# Patient Record
Sex: FEMALE | Race: WHITE | Employment: PART TIME | URBAN - NONMETROPOLITAN AREA
[De-identification: names, ages, dates, MRNs, and addresses within clinical notes are randomized per-mention and may not be internally consistent; named-entity substitution may affect disease eponyms.]

---

## 2017-01-09 DIAGNOSIS — K51.919 ULCERATIVE COLITIS WITH COMPLICATION, UNSPECIFIED LOCATION (HCC): Primary | ICD-10-CM

## 2017-02-16 ENCOUNTER — TELEPHONE (OUTPATIENT)
Dept: FAMILY MEDICINE CLINIC | Facility: CLINIC | Age: 62
End: 2017-02-16

## 2017-02-16 NOTE — TELEPHONE ENCOUNTER
Patient states that there is a documented case another employee at her place of work is hospitalized with bacterial meningitis. She had contact with her Thursday and the patient came down with symptoms Monday.  She said she does not like to take medication

## 2017-02-16 NOTE — TELEPHONE ENCOUNTER
If there is a documented case then needs prophylaxis. If suspected watch for headache, neck stiffness, rash and/or fever.

## 2017-03-06 ENCOUNTER — TELEPHONE (OUTPATIENT)
Dept: FAMILY MEDICINE CLINIC | Facility: CLINIC | Age: 62
End: 2017-03-06

## 2017-03-06 RX ORDER — LEVOTHYROXINE SODIUM 0.07 MG/1
75 TABLET ORAL
Qty: 90 TABLET | Refills: 0 | Status: SHIPPED | OUTPATIENT
Start: 2017-03-06 | End: 2017-05-26

## 2017-03-29 ENCOUNTER — TELEPHONE (OUTPATIENT)
Dept: FAMILY MEDICINE CLINIC | Facility: CLINIC | Age: 62
End: 2017-03-29

## 2017-03-29 DIAGNOSIS — Z13.820 SCREENING FOR OSTEOPOROSIS: Primary | ICD-10-CM

## 2017-03-29 NOTE — TELEPHONE ENCOUNTER
Pt states that Dr. Ti Diaz ordered a dexa scan due to long term anti-inflammatory use. Pt states that she jiménez not reached her deductible as of yet and needs to have test ordered as preventative for insurance to cover.  Pt states she called Dr. Milly Moyer offic

## 2017-04-26 ENCOUNTER — HOSPITAL ENCOUNTER (OUTPATIENT)
Dept: BONE DENSITY | Age: 62
Discharge: HOME | End: 2017-04-26
Attending: INTERNAL MEDICINE
Payer: COMMERCIAL

## 2017-04-26 DIAGNOSIS — Z13.820 SCREENING FOR OSTEOPOROSIS: ICD-10-CM

## 2017-04-26 PROCEDURE — 77080 DXA BONE DENSITY AXIAL: CPT

## 2017-05-26 ENCOUNTER — TELEPHONE (OUTPATIENT)
Dept: FAMILY MEDICINE CLINIC | Facility: CLINIC | Age: 62
End: 2017-05-26

## 2017-05-26 DIAGNOSIS — E03.9 ACQUIRED HYPOTHYROIDISM: ICD-10-CM

## 2017-05-26 DIAGNOSIS — Z00.00 PREVENTATIVE HEALTH CARE: Primary | ICD-10-CM

## 2017-05-26 RX ORDER — LEVOTHYROXINE SODIUM 0.07 MG/1
75 TABLET ORAL
Qty: 90 TABLET | Refills: 0 | Status: SHIPPED | OUTPATIENT
Start: 2017-05-26 | End: 2017-09-07

## 2017-05-26 RX ORDER — DESONIDE 0.5 MG/G
CREAM TOPICAL
Qty: 60 G | Refills: 0 | Status: SHIPPED | OUTPATIENT
Start: 2017-05-26 | End: 2017-11-29

## 2017-06-06 ENCOUNTER — LAB ENCOUNTER (OUTPATIENT)
Dept: LAB | Age: 62
End: 2017-06-06
Attending: INTERNAL MEDICINE
Payer: COMMERCIAL

## 2017-06-06 DIAGNOSIS — E03.9 ACQUIRED HYPOTHYROIDISM: ICD-10-CM

## 2017-06-06 DIAGNOSIS — K51.50 LEFT SIDED ULCERATIVE (CHRONIC) COLITIS (HCC): ICD-10-CM

## 2017-06-06 DIAGNOSIS — M19.90 OSTEOARTHRITIS, UNSPECIFIED OSTEOARTHRITIS TYPE, UNSPECIFIED SITE: ICD-10-CM

## 2017-06-06 DIAGNOSIS — Z00.00 PREVENTATIVE HEALTH CARE: ICD-10-CM

## 2017-06-06 PROCEDURE — 84443 ASSAY THYROID STIM HORMONE: CPT | Performed by: INTERNAL MEDICINE

## 2017-06-06 PROCEDURE — 36415 COLL VENOUS BLD VENIPUNCTURE: CPT | Performed by: INTERNAL MEDICINE

## 2017-06-06 PROCEDURE — 80053 COMPREHEN METABOLIC PANEL: CPT | Performed by: INTERNAL MEDICINE

## 2017-06-06 PROCEDURE — 80061 LIPID PANEL: CPT | Performed by: INTERNAL MEDICINE

## 2017-06-06 PROCEDURE — 84439 ASSAY OF FREE THYROXINE: CPT | Performed by: INTERNAL MEDICINE

## 2017-06-06 PROCEDURE — 82306 VITAMIN D 25 HYDROXY: CPT | Performed by: INTERNAL MEDICINE

## 2017-09-07 RX ORDER — LEVOTHYROXINE SODIUM 0.07 MG/1
TABLET ORAL
Qty: 90 TABLET | Refills: 0 | Status: SHIPPED | OUTPATIENT
Start: 2017-09-07 | End: 2017-12-01

## 2017-09-19 ENCOUNTER — LABORATORY ENCOUNTER (OUTPATIENT)
Dept: LAB | Age: 62
End: 2017-09-19
Attending: INTERNAL MEDICINE
Payer: COMMERCIAL

## 2017-09-19 DIAGNOSIS — K51.919 ULCERATIVE COLITIS WITH COMPLICATION, UNSPECIFIED LOCATION (HCC): ICD-10-CM

## 2017-09-19 DIAGNOSIS — K51.50 LEFT SIDED COLITIS WITHOUT COMPLICATIONS (HCC): ICD-10-CM

## 2017-09-19 LAB
ALBUMIN SERPL-MCNC: 3.4 G/DL (ref 3.5–4.8)
ALP LIVER SERPL-CCNC: 95 U/L (ref 46–118)
ALT SERPL-CCNC: 18 U/L (ref 14–54)
AST SERPL-CCNC: 10 U/L (ref 15–41)
BASOPHILS # BLD AUTO: 0.06 X10(3) UL (ref 0–0.1)
BASOPHILS NFR BLD AUTO: 0.5 %
BILIRUB SERPL-MCNC: 0.3 MG/DL (ref 0.1–2)
BUN BLD-MCNC: 13 MG/DL (ref 8–20)
C-REACTIVE PROTEIN: 0.74 MG/DL (ref ?–1)
CALCIUM BLD-MCNC: 9.2 MG/DL (ref 8.3–10.3)
CHLORIDE: 104 MMOL/L (ref 101–111)
CO2: 28 MMOL/L (ref 22–32)
CREAT BLD-MCNC: 0.74 MG/DL (ref 0.55–1.02)
EOSINOPHIL # BLD AUTO: 0.21 X10(3) UL (ref 0–0.3)
EOSINOPHIL NFR BLD AUTO: 1.7 %
ERYTHROCYTE [DISTWIDTH] IN BLOOD BY AUTOMATED COUNT: 12.5 % (ref 11.5–16)
GLUCOSE BLD-MCNC: 88 MG/DL (ref 70–99)
HCT VFR BLD AUTO: 40.2 % (ref 34–50)
HGB BLD-MCNC: 12.8 G/DL (ref 12–16)
IMMATURE GRANULOCYTE COUNT: 0.04 X10(3) UL (ref 0–1)
IMMATURE GRANULOCYTE RATIO %: 0.3 %
LYMPHOCYTES # BLD AUTO: 2.06 X10(3) UL (ref 0.9–4)
LYMPHOCYTES NFR BLD AUTO: 17.2 %
M PROTEIN MFR SERPL ELPH: 7.5 G/DL (ref 6.1–8.3)
MCH RBC QN AUTO: 28.4 PG (ref 27–33.2)
MCHC RBC AUTO-ENTMCNC: 31.8 G/DL (ref 31–37)
MCV RBC AUTO: 89.3 FL (ref 81–100)
MONOCYTES # BLD AUTO: 0.88 X10(3) UL (ref 0.1–0.6)
MONOCYTES NFR BLD AUTO: 7.3 %
NEUTROPHIL ABS PRELIM: 8.76 X10 (3) UL (ref 1.3–6.7)
NEUTROPHILS # BLD AUTO: 8.76 X10(3) UL (ref 1.3–6.7)
NEUTROPHILS NFR BLD AUTO: 73 %
PLATELET # BLD AUTO: 306 10(3)UL (ref 150–450)
POTASSIUM SERPL-SCNC: 4.1 MMOL/L (ref 3.6–5.1)
RBC # BLD AUTO: 4.5 X10(6)UL (ref 3.8–5.1)
RED CELL DISTRIBUTION WIDTH-SD: 40.6 FL (ref 35.1–46.3)
SED RATE-ML: 15 MM/HR (ref 0–25)
SODIUM SERPL-SCNC: 139 MMOL/L (ref 136–144)
WBC # BLD AUTO: 12 X10(3) UL (ref 4–13)

## 2017-09-19 PROCEDURE — 85025 COMPLETE CBC W/AUTO DIFF WBC: CPT

## 2017-09-19 PROCEDURE — 36415 COLL VENOUS BLD VENIPUNCTURE: CPT

## 2017-09-19 PROCEDURE — 80053 COMPREHEN METABOLIC PANEL: CPT

## 2017-09-19 PROCEDURE — 87493 C DIFF AMPLIFIED PROBE: CPT

## 2017-09-19 PROCEDURE — 86140 C-REACTIVE PROTEIN: CPT

## 2017-09-19 PROCEDURE — 85652 RBC SED RATE AUTOMATED: CPT

## 2017-09-27 ENCOUNTER — HOSPITAL ENCOUNTER (OUTPATIENT)
Dept: CT IMAGING | Age: 62
Discharge: HOME | End: 2017-09-27
Attending: NURSE PRACTITIONER
Payer: COMMERCIAL

## 2017-09-27 DIAGNOSIS — R10.32 LEFT LOWER QUADRANT PAIN: ICD-10-CM

## 2017-09-27 PROCEDURE — 74177 CT ABD & PELVIS W/CONTRAST: CPT | Performed by: NURSE PRACTITIONER

## 2017-10-20 ENCOUNTER — TELEPHONE (OUTPATIENT)
Dept: FAMILY MEDICINE CLINIC | Facility: CLINIC | Age: 62
End: 2017-10-20

## 2017-10-20 DIAGNOSIS — Z12.31 SCREENING MAMMOGRAM, ENCOUNTER FOR: Primary | ICD-10-CM

## 2017-11-20 ENCOUNTER — HOSPITAL ENCOUNTER (OUTPATIENT)
Dept: MAMMOGRAPHY | Age: 62
Discharge: HOME | End: 2017-11-20
Attending: INTERNAL MEDICINE
Payer: COMMERCIAL

## 2017-11-20 DIAGNOSIS — Z12.31 SCREENING MAMMOGRAM, ENCOUNTER FOR: ICD-10-CM

## 2017-11-20 PROCEDURE — 77067 SCR MAMMO BI INCL CAD: CPT | Performed by: INTERNAL MEDICINE

## 2017-11-30 RX ORDER — DESONIDE 0.5 MG/G
CREAM TOPICAL
Qty: 60 G | Refills: 0 | Status: SHIPPED | OUTPATIENT
Start: 2017-11-30 | End: 2018-09-11

## 2017-12-01 RX ORDER — LEVOTHYROXINE SODIUM 0.07 MG/1
TABLET ORAL
Qty: 90 TABLET | Refills: 0 | Status: SHIPPED | OUTPATIENT
Start: 2017-12-01 | End: 2018-03-14

## 2018-01-12 ENCOUNTER — OFFICE VISIT (OUTPATIENT)
Dept: FAMILY MEDICINE CLINIC | Facility: CLINIC | Age: 63
End: 2018-01-12

## 2018-01-12 VITALS
BODY MASS INDEX: 36 KG/M2 | DIASTOLIC BLOOD PRESSURE: 88 MMHG | OXYGEN SATURATION: 96 % | HEART RATE: 68 BPM | TEMPERATURE: 99 F | WEIGHT: 201 LBS | SYSTOLIC BLOOD PRESSURE: 138 MMHG

## 2018-01-12 DIAGNOSIS — R05.9 COUGH: ICD-10-CM

## 2018-01-12 DIAGNOSIS — J01.90 ACUTE RHINOSINUSITIS: Primary | ICD-10-CM

## 2018-01-12 PROCEDURE — 99213 OFFICE O/P EST LOW 20 MIN: CPT | Performed by: FAMILY MEDICINE

## 2018-01-12 RX ORDER — AMOXICILLIN AND CLAVULANATE POTASSIUM 875; 125 MG/1; MG/1
1 TABLET, FILM COATED ORAL 2 TIMES DAILY
Qty: 20 TABLET | Refills: 0 | Status: SHIPPED | OUTPATIENT
Start: 2018-01-12 | End: 2018-01-22

## 2018-01-12 RX ORDER — ALBUTEROL SULFATE 90 UG/1
2 AEROSOL, METERED RESPIRATORY (INHALATION) EVERY 6 HOURS PRN
Qty: 1 INHALER | Refills: 1 | Status: SHIPPED | OUTPATIENT
Start: 2018-01-12 | End: 2019-01-12

## 2018-01-12 NOTE — PROGRESS NOTES
HPI:   Katey Appiah is a 63year old female who presents for upper respiratory symptoms for  2  weeks.  Patient reports sore throat, congestion, dry cough, cough with green colored sputum, cough is keeping pt up at night, sinus pain, wheezing, OTC cold med Ulcerative colitis ([facility]    • [facility]    • Vitamin D deficiency       Past Surgical History:  No date:   No date: CARPAL TUNNEL RELEASE  No date: COLONOSCOPY  No date: SIGMOIDOSCOPY,DIAGNOSTIC   Family History   Problem Relation A sx's persist or worsen. No orders of the defined types were placed in this encounter.         Meds & Refills for this Visit:  Signed Prescriptions Disp Refills    Amoxicillin-Pot Clavulanate 875-125 MG Oral Tab 20 tablet 0      Sig: Take 1 tablet by mout

## 2018-01-23 ENCOUNTER — TELEPHONE (OUTPATIENT)
Dept: FAMILY MEDICINE CLINIC | Facility: CLINIC | Age: 63
End: 2018-01-23

## 2018-01-23 NOTE — TELEPHONE ENCOUNTER
Pt was in to see Dr. Sangita Sanders last week 1/12. Was told to call back if she thought she needed prednisone. Pt said she is starting to cough, not sure if she needs it or not. Would like to speak to nurse. Still using inhaler.

## 2018-01-23 NOTE — TELEPHONE ENCOUNTER
Patient said that she finished the Augmentin yesterday, she is taking Mucinex DM and using her Albuterol every 4-6 hours as needed. She said her chest just feels tight. She said Dr Brittani Aguilar was going to put her on a steroid but she wanted to hold off.  She s

## 2018-01-25 ENCOUNTER — TELEPHONE (OUTPATIENT)
Dept: FAMILY MEDICINE CLINIC | Facility: CLINIC | Age: 63
End: 2018-01-25

## 2018-01-25 RX ORDER — PREDNISONE 20 MG/1
TABLET ORAL
Qty: 16 TABLET | Refills: 0 | Status: SHIPPED | OUTPATIENT
Start: 2018-01-25 | End: 2018-01-25

## 2018-01-25 RX ORDER — PREDNISONE 20 MG/1
TABLET ORAL
Qty: 16 TABLET | Refills: 0 | Status: SHIPPED | OUTPATIENT
Start: 2018-01-25 | End: 2018-06-28 | Stop reason: ALTCHOICE

## 2018-01-25 NOTE — TELEPHONE ENCOUNTER
Patient said she that her chest is still tight she is taking her inhaler and using Mucinex but she is still coughing. She said that she needs to take care of her  so will Dr Guru Paredes prescribe Prednisone.

## 2018-01-25 NOTE — TELEPHONE ENCOUNTER
PATIENT ASKING IF YOU CAN SEND STEROID SCRIPT TO [facility] [place] [facility]. PLEASE CALL WHEN COMPLETED OR TEXT.

## 2018-01-29 ENCOUNTER — TELEPHONE (OUTPATIENT)
Dept: FAMILY MEDICINE CLINIC | Facility: CLINIC | Age: 63
End: 2018-01-29

## 2018-01-29 NOTE — TELEPHONE ENCOUNTER
I cannot treat over the phone, can be seen at an urgent care at her convienent if impossible to make it the the office.

## 2018-01-29 NOTE — TELEPHONE ENCOUNTER
Patient said that finished the antibiotic 1 week ago, she has been taking Mucinex for 3 weeks and she has 5 days left of the Prednisone. She is using the inhaler but she said that she has a lot of tightness in her chest and wheezing.  She is using her inhal

## 2018-01-30 ENCOUNTER — OFFICE VISIT (OUTPATIENT)
Dept: FAMILY MEDICINE CLINIC | Facility: CLINIC | Age: 63
End: 2018-01-30

## 2018-01-30 VITALS
WEIGHT: 201 LBS | OXYGEN SATURATION: 98 % | HEART RATE: 86 BPM | DIASTOLIC BLOOD PRESSURE: 78 MMHG | SYSTOLIC BLOOD PRESSURE: 144 MMHG | TEMPERATURE: 99 F | BODY MASS INDEX: 36 KG/M2

## 2018-01-30 DIAGNOSIS — R05.9 COUGH: Primary | ICD-10-CM

## 2018-01-30 PROCEDURE — 99214 OFFICE O/P EST MOD 30 MIN: CPT | Performed by: INTERNAL MEDICINE

## 2018-01-30 RX ORDER — AMOXICILLIN AND CLAVULANATE POTASSIUM 500; 125 MG/1; MG/1
1 TABLET, FILM COATED ORAL 3 TIMES DAILY
Qty: 30 TABLET | Refills: 0 | Status: SHIPPED | OUTPATIENT
Start: 2018-01-30 | End: 2018-06-28 | Stop reason: ALTCHOICE

## 2018-01-30 RX ORDER — AMOXICILLIN AND CLAVULANATE POTASSIUM 500; 125 MG/1; MG/1
1 TABLET, FILM COATED ORAL 3 TIMES DAILY
Qty: 30 TABLET | Refills: 0 | Status: SHIPPED | OUTPATIENT
Start: 2018-01-30 | End: 2018-01-30

## 2018-01-30 NOTE — PROGRESS NOTES
HPI:   Bentley Blackburn is a 63year old female who presents for upper respiratory symptoms for  7  days. Patient reports sore throat, congestion, dry cough, wheezing, pt on Augmentin and prednisone. .      Current Outpatient Prescriptions:  predniSONE 20 MG O History   Problem Relation Age of Onset   • Heart Disorder Father    • Hypertension Father    • Cancer Mother 80     colon cancer   • Colon Cancer Mother    • Heart Disorder Brother       Smoking status: Never Smoker

## 2018-03-14 RX ORDER — LEVOTHYROXINE SODIUM 0.07 MG/1
TABLET ORAL
Qty: 90 TABLET | Refills: 0 | Status: SHIPPED | OUTPATIENT
Start: 2018-03-14 | End: 2018-06-07

## 2018-03-14 NOTE — TELEPHONE ENCOUNTER
Last office visit: 1/30/2018  Last TSH taken 6/6/2017: 1.220    Last refill: 12/1/2017    Pending Prescriptions Disp Refills    LEVOTHYROXINE SODIUM 75 MCG Oral Tab [Pharmacy Med Name: LEVOTHYROXINE 0.075MG (75MCG) TABS] 90 tablet 0     Sig: TAKE 1 TABLET(75 MCG) BY MOUTH BEFORE BREAKFAST         No future appointments.

## 2018-06-07 RX ORDER — LEVOTHYROXINE SODIUM 0.07 MG/1
TABLET ORAL
Qty: 90 TABLET | Refills: 0 | Status: SHIPPED | OUTPATIENT
Start: 2018-06-07 | End: 2018-08-30

## 2018-06-07 NOTE — TELEPHONE ENCOUNTER
Last office visit:  1/30/2018  Last TSH taken 6/06/2017: 1.220     Last refill: 3/14/2018    Pending Prescriptions Disp Refills    LEVOTHYROXINE SODIUM 75 MCG Oral Tab [Pharmacy Med Name: LEVOTHYROXINE 0.075MG (75MCG) TABS] 90 tablet 0     Sig: TAKE 1 TABL

## 2018-06-27 ENCOUNTER — TELEPHONE (OUTPATIENT)
Dept: FAMILY MEDICINE CLINIC | Facility: CLINIC | Age: 63
End: 2018-06-27

## 2018-06-28 ENCOUNTER — OFFICE VISIT (OUTPATIENT)
Dept: FAMILY MEDICINE CLINIC | Facility: CLINIC | Age: 63
End: 2018-06-28

## 2018-06-28 ENCOUNTER — LAB ENCOUNTER (OUTPATIENT)
Dept: LAB | Age: 63
End: 2018-06-28
Attending: INTERNAL MEDICINE
Payer: COMMERCIAL

## 2018-06-28 VITALS
DIASTOLIC BLOOD PRESSURE: 70 MMHG | TEMPERATURE: 98 F | HEART RATE: 72 BPM | SYSTOLIC BLOOD PRESSURE: 140 MMHG | WEIGHT: 199.5 LBS | BODY MASS INDEX: 35 KG/M2 | OXYGEN SATURATION: 97 %

## 2018-06-28 DIAGNOSIS — E03.9 ACQUIRED HYPOTHYROIDISM: ICD-10-CM

## 2018-06-28 DIAGNOSIS — K51.20 ULCERATIVE PROCTITIS WITHOUT COMPLICATION (HCC): ICD-10-CM

## 2018-06-28 DIAGNOSIS — R05.3 CHRONIC COUGH: Primary | ICD-10-CM

## 2018-06-28 PROCEDURE — 99214 OFFICE O/P EST MOD 30 MIN: CPT | Performed by: INTERNAL MEDICINE

## 2018-06-28 PROCEDURE — 86140 C-REACTIVE PROTEIN: CPT | Performed by: INTERNAL MEDICINE

## 2018-06-28 PROCEDURE — 85652 RBC SED RATE AUTOMATED: CPT | Performed by: INTERNAL MEDICINE

## 2018-06-28 PROCEDURE — 80050 GENERAL HEALTH PANEL: CPT | Performed by: INTERNAL MEDICINE

## 2018-06-28 PROCEDURE — 36415 COLL VENOUS BLD VENIPUNCTURE: CPT | Performed by: INTERNAL MEDICINE

## 2018-06-28 RX ORDER — MESALAMINE 800 MG/1
2400 TABLET, DELAYED RELEASE ORAL DAILY
Qty: 180 TABLET | Refills: 0 | COMMUNITY
Start: 2018-06-28 | End: 2019-05-11 | Stop reason: ALTCHOICE

## 2018-06-28 RX ORDER — FLUTICASONE PROPIONATE 50 MCG
1 SPRAY, SUSPENSION (ML) NASAL DAILY
Qty: 1 INHALER | Refills: 1 | Status: SHIPPED | OUTPATIENT
Start: 2018-06-28 | End: 2019-07-23 | Stop reason: ALTCHOICE

## 2018-06-28 RX ORDER — NYSTATIN 100000 [USP'U]/G
1 POWDER TOPICAL NIGHTLY
Qty: 56.7 G | Refills: 0 | Status: SHIPPED | OUTPATIENT
Start: 2018-06-28 | End: 2018-08-31

## 2018-06-30 NOTE — PROGRESS NOTES
HPI:   Isabella Mccoy is a 63year old female who presents for upper respiratory symptoms for  2  weeks. Patient reports sore throat, congestion, dry cough, sinus pain.       Current Outpatient Prescriptions:  Econazole Nitrate 1 % External Cream Apply 1 g t Obesity, unspecified    • Osteoarthritis    • Pain with bowel movements    • Ulcerative colitis ([facility]    • Ulcerative colitis ([facility]    • Vitamin D deficiency       Past Surgical History:  No date:   No date: CARPAL TUNNEL RELEASE  No date: COLONOS

## 2018-07-11 ENCOUNTER — TELEPHONE (OUTPATIENT)
Dept: FAMILY MEDICINE CLINIC | Facility: CLINIC | Age: 63
End: 2018-07-11

## 2018-07-11 RX ORDER — FLUCONAZOLE 150 MG/1
150 TABLET ORAL
Qty: 3 TABLET | Refills: 0 | Status: SHIPPED | OUTPATIENT
Start: 2018-07-11 | End: 2018-07-18

## 2018-07-11 NOTE — TELEPHONE ENCOUNTER
Nystatin not resolving issue very well. Using econazole bid, will use desonide if doing a lot of sweating. Reports the itching is worse at night, \"under the fur\". Wonders if there is something else she can try.

## 2018-07-26 ENCOUNTER — TELEPHONE (OUTPATIENT)
Dept: FAMILY MEDICINE CLINIC | Facility: CLINIC | Age: 63
End: 2018-07-26

## 2018-07-26 ENCOUNTER — NURSE ONLY (OUTPATIENT)
Dept: FAMILY MEDICINE CLINIC | Facility: CLINIC | Age: 63
End: 2018-07-26

## 2018-07-26 DIAGNOSIS — R19.5 LOOSE STOOLS: ICD-10-CM

## 2018-07-26 DIAGNOSIS — K51.819 OTHER ULCERATIVE COLITIS WITH COMPLICATION (HCC): ICD-10-CM

## 2018-07-26 PROCEDURE — 87493 C DIFF AMPLIFIED PROBE: CPT | Performed by: NURSE PRACTITIONER

## 2018-07-26 NOTE — TELEPHONE ENCOUNTER
Patient said that Dr Pito Maki ordered a test for her over the phone for a stool. She wants to bring specimen in here. There was a c-dif ordered on 7/18/18 by Curtis AIKEN.

## 2018-07-26 NOTE — TELEPHONE ENCOUNTER
She said that the specimen is in a pill bottle. Per Seven Burgess at THE [facility] lab that is not acceptable, it needs to be in a designated sterile container. Patient advised she said she will come her and transfer to container herself.  Nurse appointment scheduled

## 2018-07-26 NOTE — TELEPHONE ENCOUNTER
AFSHIN IS AT  NOW AND THEY TOLD HER THAT HER INSURANCE ISN'T IN NETWORK WITH THEM, IS THERE SOMEPLACE ELSE SHE CAN TAKE IT?

## 2018-08-30 ENCOUNTER — OFFICE VISIT (OUTPATIENT)
Dept: FAMILY MEDICINE CLINIC | Facility: CLINIC | Age: 63
End: 2018-08-30
Payer: COMMERCIAL

## 2018-08-30 VITALS
TEMPERATURE: 99 F | OXYGEN SATURATION: 98 % | BODY MASS INDEX: 35.35 KG/M2 | WEIGHT: 197 LBS | HEART RATE: 73 BPM | SYSTOLIC BLOOD PRESSURE: 126 MMHG | HEIGHT: 62.75 IN | DIASTOLIC BLOOD PRESSURE: 78 MMHG

## 2018-08-30 DIAGNOSIS — E03.9 ACQUIRED HYPOTHYROIDISM: ICD-10-CM

## 2018-08-30 DIAGNOSIS — Z00.00 WELL ADULT EXAM: Primary | ICD-10-CM

## 2018-08-30 PROCEDURE — 99396 PREV VISIT EST AGE 40-64: CPT | Performed by: INTERNAL MEDICINE

## 2018-08-30 PROCEDURE — 88175 CYTOPATH C/V AUTO FLUID REDO: CPT | Performed by: INTERNAL MEDICINE

## 2018-08-30 PROCEDURE — 87624 HPV HI-RISK TYP POOLED RSLT: CPT | Performed by: INTERNAL MEDICINE

## 2018-08-30 RX ORDER — LEVOTHYROXINE SODIUM 0.07 MG/1
TABLET ORAL
Qty: 90 TABLET | Refills: 3 | Status: SHIPPED | OUTPATIENT
Start: 2018-08-30 | End: 2019-09-05

## 2018-08-30 NOTE — PROGRESS NOTES
HPI:   Conrado Martinez is a 66year old female who presents for a complete physical exam. Symptoms: denies discharge, itching, burning or dysuria, is menopausal. Patient complains of nothing, she lost her  last year and living with daughter.  She was a 1 Inhaler Rfl: 1   Nystatin 526856 UNIT/GM External Powder Apply 1 Application topically nightly.  To inguinal area bilateral Disp: 56.7 g Rfl: 0   Albuterol Sulfate HFA (PROVENTIL HFA) 108 (90 Base) MCG/ACT Inhalation Aero Soln Inhale 2 puffs into the lung Family History   Problem Relation Age of Onset   • Heart Disorder Father    • Hypertension Father    • Cancer Mother 80     colon cancer   • Colon Cancer Mother    • Heart Disorder Brother       Social History:   Smoking status: Never Smoker tender,FROM of the back  EXTREMITIES: no cyanosis, clubbing or edema  NEURO: Oriented times three,cranial nerves are intact,motor and sensory are grossly intact    ASSESSMENT AND PLAN:   Leopoldo Barrow is a 66year old female who presents for a complete phy

## 2018-08-31 LAB — HPV I/H RISK 1 DNA SPEC QL NAA+PROBE: NEGATIVE

## 2018-08-31 RX ORDER — NYSTATIN 100000 [USP'U]/G
POWDER TOPICAL
Qty: 60 G | Refills: 0 | Status: SHIPPED | OUTPATIENT
Start: 2018-08-31 | End: 2018-12-23

## 2018-08-31 NOTE — TELEPHONE ENCOUNTER
Last office visit:  8/30/2018    Last refill: 6/28/2018    Pending Prescriptions Disp Refills    NYSTOP 059734 UNIT/GM External Powder [Pharmacy Med Name: NYSTOP 100,000 U/GM TOP POWDER 60GM] 60 g 0     Sig: APPLY 1 APPLICATION TOPICALLY NIGHTLY TO INGUINA

## 2018-09-04 LAB — LAST PAP RESULT: NORMAL

## 2018-09-11 RX ORDER — DESONIDE 0.5 MG/G
CREAM TOPICAL
Qty: 60 G | Refills: 0 | Status: SHIPPED | OUTPATIENT
Start: 2018-09-11 | End: 2020-04-04

## 2018-11-15 PROBLEM — K51.50 LEFT SIDED ULCERATIVE (CHRONIC) COLITIS (HCC): Status: ACTIVE | Noted: 2018-11-15

## 2018-12-04 ENCOUNTER — TELEPHONE (OUTPATIENT)
Dept: FAMILY MEDICINE CLINIC | Facility: CLINIC | Age: 63
End: 2018-12-04

## 2018-12-12 ENCOUNTER — HOSPITAL ENCOUNTER (OUTPATIENT)
Dept: MAMMOGRAPHY | Age: 63
Discharge: HOME | End: 2018-12-12
Attending: INTERNAL MEDICINE
Payer: COMMERCIAL

## 2018-12-12 DIAGNOSIS — Z00.00 WELL ADULT EXAM: ICD-10-CM

## 2018-12-12 PROCEDURE — 77063 BREAST TOMOSYNTHESIS BI: CPT | Performed by: INTERNAL MEDICINE

## 2018-12-12 PROCEDURE — 77067 SCR MAMMO BI INCL CAD: CPT | Performed by: INTERNAL MEDICINE

## 2018-12-24 RX ORDER — NYSTATIN 100000 [USP'U]/G
POWDER TOPICAL
Qty: 60 G | Refills: 0 | Status: SHIPPED | OUTPATIENT
Start: 2018-12-24 | End: 2019-07-23 | Stop reason: ALTCHOICE

## 2019-02-11 ENCOUNTER — TELEPHONE (OUTPATIENT)
Dept: FAMILY MEDICINE CLINIC | Facility: CLINIC | Age: 64
End: 2019-02-11

## 2019-02-11 DIAGNOSIS — Z13.220 SCREENING FOR CHOLESTEROL LEVEL: ICD-10-CM

## 2019-02-11 DIAGNOSIS — E03.9 ACQUIRED HYPOTHYROIDISM: Primary | ICD-10-CM

## 2019-02-11 NOTE — TELEPHONE ENCOUNTER
AFSHIN IS COMING IN ON FEB 19 TO DO LABS ORDERED BY DR SALVADOR.  PT WANTS TO KNOW IF DR BERKOWITZ WANTS TO ADD THE FOLLOWING LABS:  TSH  LIPIDS  ASSAY THYROID STIM HORMONE    PLEASE CALL

## 2019-03-22 ENCOUNTER — OFFICE VISIT (OUTPATIENT)
Dept: PHYSICAL THERAPY | Age: 64
End: 2019-03-22
Attending: FAMILY MEDICINE
Payer: COMMERCIAL

## 2019-03-22 DIAGNOSIS — M75.52 BURSITIS OF LEFT SHOULDER: ICD-10-CM

## 2019-03-22 DIAGNOSIS — M25.512 CHRONIC LEFT SHOULDER PAIN: ICD-10-CM

## 2019-03-22 DIAGNOSIS — G89.29 CHRONIC LEFT SHOULDER PAIN: ICD-10-CM

## 2019-03-22 PROCEDURE — 97112 NEUROMUSCULAR REEDUCATION: CPT

## 2019-03-22 PROCEDURE — 97162 PT EVAL MOD COMPLEX 30 MIN: CPT

## 2019-03-22 NOTE — PROGRESS NOTES
UPPER EXTREMITY EVALUATION:   Referring Physician: Dr. Arun Abdi  Diagnosis: Chronic left shoulder pain (M25.512,G89.29)  Bursitis of left shoulder (M75.52)     Date of Service: 3/22/2019     PATIENT Mick Dillon is a 66year old y/o female who pre stabilizers. Jayda would benefit from skilled Physical Therapy to address the above impairments to attempt to restore ROM, increase strength and address restrictions of soft tissue and musculature in order to alleviate pain and facilitate return to PLOF. clinical rationale of treatment along with avoidance of aggravating activities to reduce pain with ADLs.   Patient was instructed in and issued a HEP for: scapular squeezes 15x2 5 sec hold 2x daily PT applied leukotape to L shoulder for retraction at rest t day period. Treatment will include: Manual Therapy; Therapeutic Exercises; Neuromuscular Re-education; Therapeutic Activity; Electrical Stim; Ultrasound;  Pt education; Home exercise program instructions;     Education or treatment limitation: None  Rehab P

## 2019-03-22 NOTE — PROGRESS NOTES
Dx: Chronic left shoulder pain (M25.512,G89.29)  Bursitis of left shoulder (M75.52)           Authorized # of Visits:  Medical Necessity         Next MD visit: none scheduled  Fall Risk: standard         Precautions: n/a             Subjective: Pt states t Head Position:    Posterior glide coupled with osteokinematic movement: +        Assessment: Session focused on PROM and stretching along with building therapeutic bond as pt is dealing with psychosocial issues this session and requires increased time and grade 2-3 inferior/posterior mobs 10 mins         Manual stretching of pec/ all 3 heads, bicep along with lat stretch 5 mins total         PT instructs pt in home bicep stretch holding 30 secs 3-5x for LUE *         PT instructs pt in ER for scapular stabi

## 2019-03-26 ENCOUNTER — OFFICE VISIT (OUTPATIENT)
Dept: PHYSICAL THERAPY | Age: 64
End: 2019-03-26
Attending: INTERNAL MEDICINE
Payer: COMMERCIAL

## 2019-03-26 ENCOUNTER — LABORATORY ENCOUNTER (OUTPATIENT)
Dept: LAB | Age: 64
End: 2019-03-26
Attending: INTERNAL MEDICINE
Payer: COMMERCIAL

## 2019-03-26 DIAGNOSIS — K51.90 ULCERATIVE COLITIS WITHOUT COMPLICATIONS, UNSPECIFIED LOCATION (HCC): ICD-10-CM

## 2019-03-26 DIAGNOSIS — Z13.220 SCREENING FOR CHOLESTEROL LEVEL: ICD-10-CM

## 2019-03-26 DIAGNOSIS — E03.9 ACQUIRED HYPOTHYROIDISM: ICD-10-CM

## 2019-03-26 LAB
ALBUMIN SERPL-MCNC: 3.9 G/DL (ref 3.4–5)
ALBUMIN/GLOB SERPL: 1.1 {RATIO} (ref 1–2)
ALP LIVER SERPL-CCNC: 92 U/L (ref 50–130)
ALT SERPL-CCNC: 17 U/L (ref 13–56)
ANION GAP SERPL CALC-SCNC: 7 MMOL/L (ref 0–18)
AST SERPL-CCNC: 15 U/L (ref 15–37)
BASOPHILS # BLD AUTO: 0.03 X10(3) UL (ref 0–0.2)
BASOPHILS NFR BLD AUTO: 0.4 %
BILIRUB SERPL-MCNC: 0.7 MG/DL (ref 0.1–2)
BUN BLD-MCNC: 15 MG/DL (ref 7–18)
BUN/CREAT SERPL: 20.8 (ref 10–20)
CALCIUM BLD-MCNC: 9 MG/DL (ref 8.5–10.1)
CHLORIDE SERPL-SCNC: 103 MMOL/L (ref 98–107)
CHOLEST SMN-MCNC: 214 MG/DL (ref ?–200)
CO2 SERPL-SCNC: 28 MMOL/L (ref 21–32)
CREAT BLD-MCNC: 0.72 MG/DL (ref 0.55–1.02)
CRP SERPL-MCNC: <0.29 MG/DL (ref ?–0.3)
DEPRECATED RDW RBC AUTO: 39.3 FL (ref 35.1–46.3)
EOSINOPHIL # BLD AUTO: 0.14 X10(3) UL (ref 0–0.7)
EOSINOPHIL NFR BLD AUTO: 2 %
ERYTHROCYTE [DISTWIDTH] IN BLOOD BY AUTOMATED COUNT: 12.3 % (ref 11–15)
GLOBULIN PLAS-MCNC: 3.7 G/DL (ref 2.8–4.4)
GLUCOSE BLD-MCNC: 83 MG/DL (ref 70–99)
HCT VFR BLD AUTO: 41.1 % (ref 35–48)
HDLC SERPL-MCNC: 81 MG/DL (ref 40–59)
HGB BLD-MCNC: 13.8 G/DL (ref 12–16)
IMM GRANULOCYTES # BLD AUTO: 0.02 X10(3) UL (ref 0–1)
IMM GRANULOCYTES NFR BLD: 0.3 %
LDLC SERPL CALC-MCNC: 115 MG/DL (ref ?–100)
LYMPHOCYTES # BLD AUTO: 2.05 X10(3) UL (ref 1–4)
LYMPHOCYTES NFR BLD AUTO: 28.7 %
M PROTEIN MFR SERPL ELPH: 7.6 G/DL (ref 6.4–8.2)
MCH RBC QN AUTO: 29.3 PG (ref 26–34)
MCHC RBC AUTO-ENTMCNC: 33.6 G/DL (ref 31–37)
MCV RBC AUTO: 87.3 FL (ref 80–100)
MONOCYTES # BLD AUTO: 0.61 X10(3) UL (ref 0.1–1)
MONOCYTES NFR BLD AUTO: 8.5 %
NEUTROPHILS # BLD AUTO: 4.3 X10 (3) UL (ref 1.5–7.7)
NEUTROPHILS # BLD AUTO: 4.3 X10(3) UL (ref 1.5–7.7)
NEUTROPHILS NFR BLD AUTO: 60.1 %
NONHDLC SERPL-MCNC: 133 MG/DL (ref ?–130)
OSMOLALITY SERPL CALC.SUM OF ELEC: 286 MOSM/KG (ref 275–295)
PLATELET # BLD AUTO: 232 10(3)UL (ref 150–450)
POTASSIUM SERPL-SCNC: 3.8 MMOL/L (ref 3.5–5.1)
RBC # BLD AUTO: 4.71 X10(6)UL (ref 3.8–5.3)
SED RATE-ML: 7 MM/HR (ref 0–25)
SODIUM SERPL-SCNC: 138 MMOL/L (ref 136–145)
T4 FREE SERPL-MCNC: 1.2 NG/DL (ref 0.8–1.7)
TRIGL SERPL-MCNC: 89 MG/DL (ref 30–149)
TSI SER-ACNC: 0.96 MIU/ML (ref 0.36–3.74)
VLDLC SERPL CALC-MCNC: 18 MG/DL (ref 0–30)
WBC # BLD AUTO: 7.2 X10(3) UL (ref 4–11)

## 2019-03-26 PROCEDURE — 97530 THERAPEUTIC ACTIVITIES: CPT

## 2019-03-26 PROCEDURE — 97140 MANUAL THERAPY 1/> REGIONS: CPT

## 2019-03-26 PROCEDURE — 84443 ASSAY THYROID STIM HORMONE: CPT | Performed by: INTERNAL MEDICINE

## 2019-03-26 PROCEDURE — 97110 THERAPEUTIC EXERCISES: CPT

## 2019-03-26 PROCEDURE — 36415 COLL VENOUS BLD VENIPUNCTURE: CPT | Performed by: INTERNAL MEDICINE

## 2019-03-26 PROCEDURE — 80061 LIPID PANEL: CPT | Performed by: INTERNAL MEDICINE

## 2019-03-26 PROCEDURE — 84439 ASSAY OF FREE THYROXINE: CPT | Performed by: INTERNAL MEDICINE

## 2019-03-29 ENCOUNTER — APPOINTMENT (OUTPATIENT)
Dept: LAB | Age: 64
End: 2019-03-29
Attending: INTERNAL MEDICINE
Payer: COMMERCIAL

## 2019-03-29 DIAGNOSIS — K51.90 ULCERATIVE COLITIS WITHOUT COMPLICATIONS, UNSPECIFIED LOCATION (HCC): ICD-10-CM

## 2019-03-29 PROCEDURE — 83993 ASSAY FOR CALPROTECTIN FECAL: CPT

## 2019-04-01 ENCOUNTER — OFFICE VISIT (OUTPATIENT)
Dept: PHYSICAL THERAPY | Age: 64
End: 2019-04-01
Attending: FAMILY MEDICINE
Payer: COMMERCIAL

## 2019-04-01 DIAGNOSIS — M75.52 BURSITIS OF LEFT SHOULDER: ICD-10-CM

## 2019-04-01 DIAGNOSIS — G89.29 CHRONIC LEFT SHOULDER PAIN: ICD-10-CM

## 2019-04-01 DIAGNOSIS — M25.512 CHRONIC LEFT SHOULDER PAIN: ICD-10-CM

## 2019-04-01 PROCEDURE — 97110 THERAPEUTIC EXERCISES: CPT

## 2019-04-01 PROCEDURE — 97140 MANUAL THERAPY 1/> REGIONS: CPT

## 2019-04-01 NOTE — PROGRESS NOTES
Dx: Chronic left shoulder pain (M25.512,G89.29)  Bursitis of left shoulder (M75.52)           Authorized # of Visits:  Medical Necessity         Next MD visit: none scheduled  Fall Risk: standard         Precautions: n/a             Subjective: Pt states h movement: +        Assessment: Session focused on scapular stability and ROM with improved compliance and understanding of HEP noted.   Goals:   · Pt will report improved ability to sleep without waking due to shoulder pain (5 visits)  · Pt will improve enoc PT instructs pt in home bicep stretch holding 30 secs 3-5x for LUE * PT instructs pt in seated chair holding UT stretch L side 30 seccs 3-5x daily mod demo, pt returns cues and education on pain vs stretch         PT instructs pt in ER for scapular stabi

## 2019-04-04 ENCOUNTER — OFFICE VISIT (OUTPATIENT)
Dept: PHYSICAL THERAPY | Age: 64
End: 2019-04-04
Attending: FAMILY MEDICINE
Payer: COMMERCIAL

## 2019-04-04 DIAGNOSIS — G89.29 CHRONIC LEFT SHOULDER PAIN: ICD-10-CM

## 2019-04-04 DIAGNOSIS — M25.512 CHRONIC LEFT SHOULDER PAIN: ICD-10-CM

## 2019-04-04 DIAGNOSIS — M75.52 BURSITIS OF LEFT SHOULDER: ICD-10-CM

## 2019-04-04 PROCEDURE — 97110 THERAPEUTIC EXERCISES: CPT

## 2019-04-04 PROCEDURE — 97112 NEUROMUSCULAR REEDUCATION: CPT

## 2019-04-04 PROCEDURE — 97140 MANUAL THERAPY 1/> REGIONS: CPT

## 2019-04-04 NOTE — PROGRESS NOTES
Dx: Chronic left shoulder pain (M25.512,G89.29)  Bursitis of left shoulder (M75.52)           Authorized # of Visits:  Medical Necessity         Next MD visit: none scheduled  Fall Risk: standard         Precautions: n/a             Subjective: Pt states h +   Scapular Position: +  Winging Scapula Manual Stabilization: +  Humeral Head Position:    Posterior glide coupled with osteokinematic movement: +        Assessment: Pt is progressing with AROM improving FLEX to 129 from 93  At IE, ABD  And ER remain yadira mobility, all planes with grade 2-3 inferior/posterior mobs 10 mins Supine L UT STM 8 mins  Supine STM to anterior L shoulder 8 mins        Manual stretching of pec/ all 3 heads, bicep along with lat stretch 5 mins total Supine PROM for GH mobility, FLEX/A

## 2019-04-08 ENCOUNTER — OFFICE VISIT (OUTPATIENT)
Dept: PHYSICAL THERAPY | Age: 64
End: 2019-04-08
Attending: FAMILY MEDICINE
Payer: COMMERCIAL

## 2019-04-08 DIAGNOSIS — M25.512 CHRONIC LEFT SHOULDER PAIN: ICD-10-CM

## 2019-04-08 DIAGNOSIS — G89.29 CHRONIC LEFT SHOULDER PAIN: ICD-10-CM

## 2019-04-08 DIAGNOSIS — M75.52 BURSITIS OF LEFT SHOULDER: ICD-10-CM

## 2019-04-08 PROCEDURE — 97110 THERAPEUTIC EXERCISES: CPT

## 2019-04-08 PROCEDURE — 97112 NEUROMUSCULAR REEDUCATION: CPT

## 2019-04-08 PROCEDURE — 97140 MANUAL THERAPY 1/> REGIONS: CPT

## 2019-04-08 NOTE — PROGRESS NOTES
Dx: Chronic left shoulder pain (M25.512,G89.29)  Bursitis of left shoulder (M75.52)           Authorized # of Visits:  Medical Necessity         Next MD visit: none scheduled  Fall Risk: standard         Precautions: n/a             Subjective: Pt states h +  Winging Scapula Manual Stabilization: +  Humeral Head Position:    Posterior glide coupled with osteokinematic movement: +        Assessment: Pt is progressing with ROM and scapular stability training.      Goals:   · Pt will report improved ability to s mobility, all planes with grade 2-3 inferior/posterior mobs 10 mins Supine L UT STM 8 mins  Supine STM to anterior L shoulder 8 mins  Supine STM to L anterior shoulder bicep cross friction, UT and RTC 8 mins total       Manual stretching of pec/ all 3 head tactile cues for decreased UT activation  PT reviews T Y and I for HEP yellow band 15x each *Mod cues for form and mo iddle low trap recruitment*        T, Y and I yellow band 5x each max tactile and verbal cues for form                 All exercises marke

## 2019-04-11 ENCOUNTER — OFFICE VISIT (OUTPATIENT)
Dept: PHYSICAL THERAPY | Age: 64
End: 2019-04-11
Attending: FAMILY MEDICINE
Payer: COMMERCIAL

## 2019-04-11 DIAGNOSIS — M25.512 CHRONIC LEFT SHOULDER PAIN: ICD-10-CM

## 2019-04-11 DIAGNOSIS — G89.29 CHRONIC LEFT SHOULDER PAIN: ICD-10-CM

## 2019-04-11 DIAGNOSIS — M75.52 BURSITIS OF LEFT SHOULDER: ICD-10-CM

## 2019-04-11 PROCEDURE — 97140 MANUAL THERAPY 1/> REGIONS: CPT

## 2019-04-11 PROCEDURE — 97110 THERAPEUTIC EXERCISES: CPT

## 2019-04-11 NOTE — PROGRESS NOTES
Dx: Chronic left shoulder pain (M25.512,G89.29)  Bursitis of left shoulder (M75.52)           Authorized # of Visits:  Medical Necessity         Next MD visit: none scheduled  Fall Risk: standard         Precautions: n/a             Subjective: Pt states s Sign: R -, L -    AC Compression: R -, L +    Symptom Modification:  Thoracic Extension: +   Scapular Position: +  Winging Scapula Manual Stabilization: +  Humeral Head Position:    Posterior glide coupled with osteokinematic movement: +        Assessment: mins total UBE warm up level 1 5 mins total forward/backward  UBE warm up level 1.5  10 mins total forward and backward UBE warm up 1.5 level 6 mins due to time constraints     Supine PROM for GH mobility, all planes with grade 2-3 inferior/posterior mobs 1lb LUE PT reviews HEP and cues for neutral neck throughout to limit compensations     PT provides emotional support throughout  Session and increased education/explanation of each activity as pt questions each pieces benefit with visual aids from Internet

## 2019-04-18 ENCOUNTER — OFFICE VISIT (OUTPATIENT)
Dept: PHYSICAL THERAPY | Age: 64
End: 2019-04-18
Attending: FAMILY MEDICINE
Payer: COMMERCIAL

## 2019-04-18 DIAGNOSIS — G89.29 CHRONIC LEFT SHOULDER PAIN: ICD-10-CM

## 2019-04-18 DIAGNOSIS — M25.512 CHRONIC LEFT SHOULDER PAIN: ICD-10-CM

## 2019-04-18 DIAGNOSIS — M75.52 BURSITIS OF LEFT SHOULDER: ICD-10-CM

## 2019-04-18 PROCEDURE — 97140 MANUAL THERAPY 1/> REGIONS: CPT

## 2019-04-18 PROCEDURE — 97110 THERAPEUTIC EXERCISES: CPT

## 2019-04-22 NOTE — PROGRESS NOTES
Dx: Chronic left shoulder pain (M25.512,G89.29)  Bursitis of left shoulder (M75.52)           Authorized # of Visits:  Medical Necessity         Next MD visit: none scheduled  Fall Risk: standard         Precautions: n/a             Subjective: Pt stateshe to >135 degrees to improve ability to don deodorant, don/doff shirts, and wash hair (10 visits) 4/18/19- Progressing 116   · Pt will increase shoulder AROM ER to 60 to be able to reach and fasten seatbelt (7 visits) 4/18/19- GOAL MET.   · Pt will increase s Y and I mod tactile and verbal cues for form 10x2  Holding end range 3 secs for mm activation and endurance each to improve middle/low trap strength and lat and rhomboid strengthening.  Supine GH mobs grade 3-4 inferior, posterior, PROM all planes LLE min O 10x2, mod demo and verbal cues for holding until stretch point Sidelying ER 10x 1lb LUE PT reviews HEP and cues for neutral neck throughout to limit compensations  Scapular clock red band min visual and verbal cues for set up LUE 5x2*    PT provides emotio

## 2019-04-23 ENCOUNTER — OFFICE VISIT (OUTPATIENT)
Dept: PHYSICAL THERAPY | Age: 64
End: 2019-04-23
Attending: FAMILY MEDICINE
Payer: COMMERCIAL

## 2019-04-23 DIAGNOSIS — M25.512 CHRONIC LEFT SHOULDER PAIN: ICD-10-CM

## 2019-04-23 DIAGNOSIS — G89.29 CHRONIC LEFT SHOULDER PAIN: ICD-10-CM

## 2019-04-23 DIAGNOSIS — M75.52 BURSITIS OF LEFT SHOULDER: ICD-10-CM

## 2019-04-23 PROCEDURE — 97110 THERAPEUTIC EXERCISES: CPT

## 2019-04-23 PROCEDURE — 97140 MANUAL THERAPY 1/> REGIONS: CPT

## 2019-04-29 ENCOUNTER — OFFICE VISIT (OUTPATIENT)
Dept: PHYSICAL THERAPY | Age: 64
End: 2019-04-29
Attending: INTERNAL MEDICINE
Payer: COMMERCIAL

## 2019-04-29 PROCEDURE — 97140 MANUAL THERAPY 1/> REGIONS: CPT

## 2019-04-29 PROCEDURE — 97110 THERAPEUTIC EXERCISES: CPT

## 2019-04-29 NOTE — PROGRESS NOTES
Dx: Chronic left shoulder pain (M25.512,G89.29)  Bursitis of left shoulder (M75.52)           Authorized # of Visits:  Medical Necessity         Next MD visit: none scheduled  Fall Risk: standard         Precautions: n/a             Subjective: Pt states s overhead cabinets without pain or restriction (10 visits) 4/29/19- GOAL MET: 143  · Pt will improve shoulder abduction AROM to >135 degrees to improve ability to don deodorant, don/doff shirts, and wash hair (10 visits) 4/29/19- GOAL   · Pt will inc bicep cross friction, UT and RTC 8 mins total  Prone STM to B UT, medial scapular borders and cervical paraspinals 10 mins Prone periscapular strength for LUE T Y and I mod tactile and verbal cues for form 10x2  Holding end range 3 secs for mm activation a and ER 15x2 each min cues for form, towel roll to protect supraspiantus PT reviews standing TYI with red band 10x2 min tactile and verbal cues for middle and lower trap activation vs UT.     PT instructs pt in ER for scapular stabilizers 15x2 yellow band re verbal cues for form                     All exercises marked with * indicate they are included in pt's HEP.   Charges: 1 manual 2 there ex     Total Timed Treatment: 45 min  Total Treatment Time: 45 min

## 2019-05-02 ENCOUNTER — OFFICE VISIT (OUTPATIENT)
Dept: PHYSICAL THERAPY | Age: 64
End: 2019-05-02
Attending: INTERNAL MEDICINE
Payer: COMMERCIAL

## 2019-05-02 PROCEDURE — 97140 MANUAL THERAPY 1/> REGIONS: CPT

## 2019-05-02 PROCEDURE — 97110 THERAPEUTIC EXERCISES: CPT

## 2019-05-02 NOTE — PROGRESS NOTES
Dx: Chronic left shoulder pain (M25.512,G89.29)  Bursitis of left shoulder (M75.52)           Authorized # of Visits:  Medical Necessity         Next MD visit: none scheduled  Fall Risk: standard         Precautions: n/a             Subjective: Pt states s and modified yoga poses for reintegration into community programs. Pt has HEP to maintain gains made in PT and will contact PT or MD with questions or concerns.       Goals:   · Pt will report improved ability to sleep without waking due to shoulder pain (5 constraints Prone STM to Medial scapular border B, UT and cervical paraspinals 10 mins effluerage Supine STM to anterior L shoulder bicep and RTC cross friction 8 mins  UBE warm up 7 mins total 2.1 level  UBE warm up 7 mins    Supine PROM for [facility] mobility, education on pain vs stretch  Sidelying ER LUE towel to protect supra 10x2 AROM 74 degrees in gravity eliminated sidelying ABD 10x2* AROM 124 before pain  AAROM Supine ABD FLEX, ER with dowel aftab 10x mod verbal cues for pain free ROM  Prone LUE T Y and I f weekend, PT reinforces ice after activity and inflammatory process, need for rest and protection of shoulder to avoid flare up, pt states she understands and will take breaks  PT provides pt with red band for TYI at HEP* Scapular clock, pt co burning pain deodorant, don/doff shirts, and wash hair (10 visits) 4/29/19- GOAL   · Pt will increase shoulder AROM ER to 60 to be able to reach and fasten seatbelt (7 visits) 4/18/19- GOAL MET.   · Pt will increase shoulder AROM IR to 80 to be able to reach in b

## 2019-05-11 ENCOUNTER — TELEPHONE (OUTPATIENT)
Dept: FAMILY MEDICINE CLINIC | Facility: CLINIC | Age: 64
End: 2019-05-11

## 2019-05-11 ENCOUNTER — OFFICE VISIT (OUTPATIENT)
Dept: FAMILY MEDICINE CLINIC | Facility: CLINIC | Age: 64
End: 2019-05-11
Payer: COMMERCIAL

## 2019-05-11 VITALS
HEIGHT: 62.75 IN | TEMPERATURE: 97 F | SYSTOLIC BLOOD PRESSURE: 118 MMHG | WEIGHT: 211 LBS | DIASTOLIC BLOOD PRESSURE: 80 MMHG | HEART RATE: 85 BPM | OXYGEN SATURATION: 95 % | BODY MASS INDEX: 37.86 KG/M2

## 2019-05-11 DIAGNOSIS — J20.6 ACUTE BRONCHITIS DUE TO RHINOVIRUS: Primary | ICD-10-CM

## 2019-05-11 PROCEDURE — 99213 OFFICE O/P EST LOW 20 MIN: CPT | Performed by: NURSE PRACTITIONER

## 2019-05-11 RX ORDER — CODEINE PHOSPHATE AND GUAIFENESIN 10; 100 MG/5ML; MG/5ML
5 SOLUTION ORAL EVERY 6 HOURS PRN
Qty: 100 ML | Refills: 0 | Status: SHIPPED | OUTPATIENT
Start: 2019-05-11 | End: 2019-05-23

## 2019-05-11 RX ORDER — BENZONATATE 100 MG/1
100 CAPSULE ORAL 3 TIMES DAILY PRN
Qty: 30 CAPSULE | Refills: 0 | Status: SHIPPED | OUTPATIENT
Start: 2019-05-11 | End: 2019-05-23

## 2019-05-11 NOTE — TELEPHONE ENCOUNTER
Discussed with Horace Chao, advised patient if she feels she needs the albuterol it's to use with the mucinex.   Solomon Gonzalez, NP

## 2019-05-11 NOTE — PROGRESS NOTES
HPI:   Cough   This is a new problem. Episode onset: 10 days. The problem has been gradually improving. The cough is non-productive.  Pertinent negatives include no chest pain, chills, ear pain, fever, headaches, nasal congestion, postnasal drip, sore thr Rfl: NYSTOP [id number] UNIT/GM External Powder APPLY 1 APPLICATION TOPICALLY NIGHTLY TO INGUINAL AREA BILATERALLY.  Disp: 60 g Rfl: 0   Hyoscyamine Sulfate 0.125 MG Sublingual SL Tab Take 1 - 2 sublingual q 4 hours as needed Disp: 60 tablet Rfl: 2      Past Neurological: Negative for headaches.         EXAM:   /80 (BP Location: Right arm, Patient Position: Sitting, Cuff Size: large)   Pulse 85   Temp 97 °F (36.1 °C) (Temporal)   Ht 62.75\"   Wt 211 lb   LMP 10/19/2012   SpO2 95%   BMI 37.68 kg/m²   Phy room air

## 2019-05-22 ENCOUNTER — TELEPHONE (OUTPATIENT)
Dept: FAMILY MEDICINE CLINIC | Facility: CLINIC | Age: 64
End: 2019-05-22

## 2019-05-22 RX ORDER — FLUTICASONE PROPIONATE AND SALMETEROL 500; 50 UG/1; UG/1
1 POWDER RESPIRATORY (INHALATION) 2 TIMES DAILY
Qty: 60 EACH | Refills: 0 | Status: SHIPPED | OUTPATIENT
Start: 2019-05-22 | End: 2019-06-21

## 2019-05-22 NOTE — TELEPHONE ENCOUNTER
Patient said she has had a cough for 3 weeks, she is taking the Benzonatate every 8 hours and the Codeine at night only. She said she saw Titus FLOOD on 5/11/19.  She said what she is coughing up is clear, and the cough is raspy she does not think

## 2019-05-22 NOTE — TELEPHONE ENCOUNTER
Patient advised. She asked if she can continue the Tessalon Pearls, cough medicine and Mucinex, advised yes.

## 2019-05-23 ENCOUNTER — TELEPHONE (OUTPATIENT)
Dept: FAMILY MEDICINE CLINIC | Facility: CLINIC | Age: 64
End: 2019-05-23

## 2019-05-23 DIAGNOSIS — J20.6 ACUTE BRONCHITIS DUE TO RHINOVIRUS: ICD-10-CM

## 2019-05-23 RX ORDER — BENZONATATE 100 MG/1
100 CAPSULE ORAL 3 TIMES DAILY PRN
Qty: 30 CAPSULE | Refills: 0 | Status: SHIPPED | OUTPATIENT
Start: 2019-05-23 | End: 2019-06-08 | Stop reason: ALTCHOICE

## 2019-05-23 RX ORDER — CODEINE PHOSPHATE AND GUAIFENESIN 10; 100 MG/5ML; MG/5ML
5 SOLUTION ORAL EVERY 6 HOURS PRN
Qty: 100 ML | Refills: 0 | Status: SHIPPED | OUTPATIENT
Start: 2019-05-23 | End: 2019-06-08 | Stop reason: ALTCHOICE

## 2019-05-23 NOTE — TELEPHONE ENCOUNTER
Patient is asking if she can get refills on the Cheratussin and Tesselon Pearls. She asked if she can have some wine with the Benzonatate, she is having company over on Sunday. She knows not to take the Cheratussin with alcohol.

## 2019-05-27 ENCOUNTER — MOBILE ENCOUNTER (OUTPATIENT)
Dept: FAMILY MEDICINE CLINIC | Facility: CLINIC | Age: 64
End: 2019-05-27

## 2019-05-27 RX ORDER — CEPHALEXIN 500 MG/1
500 CAPSULE ORAL 3 TIMES DAILY
Qty: 30 CAPSULE | Refills: 0 | Status: SHIPPED | OUTPATIENT
Start: 2019-05-27 | End: 2019-06-06

## 2019-05-28 ENCOUNTER — TELEPHONE (OUTPATIENT)
Dept: FAMILY MEDICINE CLINIC | Facility: CLINIC | Age: 64
End: 2019-05-28

## 2019-05-29 NOTE — TELEPHONE ENCOUNTER
Patient said that Dr Buckner  Prescribed an antibiotic for her on Monday because she had developed a fever. She said she is feeling slighlty better today and she had had 5 doses of Cephalexin.  She is also taking Mucinex ER, Advair, Tessalon Pearls, and the

## 2019-06-05 ENCOUNTER — TELEPHONE (OUTPATIENT)
Dept: FAMILY MEDICINE CLINIC | Facility: CLINIC | Age: 64
End: 2019-06-05

## 2019-06-05 NOTE — TELEPHONE ENCOUNTER
Patient advised, appointment scheduled Saturday at 1045am. She asked if she should continue the same medications if so she needs a refill on Benzonatate and will have to get more Mucinex.  Advised ok to try not taking these two medications until she is seen

## 2019-06-05 NOTE — TELEPHONE ENCOUNTER
Call her cell after 10, [phone].  Pt still has a sticky throat, still has little cough, call pt

## 2019-06-05 NOTE — TELEPHONE ENCOUNTER
Patient said that she feels better but is still coughing. She I still taking Mucinex, Tesselon Pearls, Advair, and Codeine Syrup and the antibiotics. She wants to know if she should be taking all these medications still.  She is wondering if she has allergy

## 2019-06-08 ENCOUNTER — OFFICE VISIT (OUTPATIENT)
Dept: FAMILY MEDICINE CLINIC | Facility: CLINIC | Age: 64
End: 2019-06-08
Payer: COMMERCIAL

## 2019-06-08 VITALS
HEIGHT: 62.75 IN | BODY MASS INDEX: 36.42 KG/M2 | HEART RATE: 60 BPM | WEIGHT: 203 LBS | RESPIRATION RATE: 12 BRPM | DIASTOLIC BLOOD PRESSURE: 80 MMHG | SYSTOLIC BLOOD PRESSURE: 120 MMHG | TEMPERATURE: 99 F

## 2019-06-08 DIAGNOSIS — J30.2 SEASONAL ALLERGIC RHINITIS, UNSPECIFIED TRIGGER: Primary | ICD-10-CM

## 2019-06-08 PROCEDURE — 99214 OFFICE O/P EST MOD 30 MIN: CPT | Performed by: INTERNAL MEDICINE

## 2019-06-08 NOTE — PROGRESS NOTES
HPI:   Adrian Client is a 66year old female who presents for upper respiratory symptoms for  1  months. Patient reports sore throat, congestion, dry cough, sinus pain. Works with children.         Current Outpatient Medications:  fluticasone-salmeterol (A in joints 4 yrs ago    upon kneeling   • Pain with bowel movements    • Rash 1962    chronic eczema   • Stress I do not remember   • Ulcerative colitis ([facility]    • Ulcerative colitis ([facility]    • Vitamin D deficiency    • Wears glasses 1982    reading glasses Cherie Villatoro is a 66year old female who presents with Upper Respiratory Infection. PLAN: OTC decongestants, throat lozenges and tylenol and flonase. The patient indicates understanding of these issues and agrees to the plan.   The patient is asked to return if

## 2019-07-18 ENCOUNTER — APPOINTMENT (OUTPATIENT)
Dept: LAB | Age: 64
End: 2019-07-18
Attending: INTERNAL MEDICINE
Payer: COMMERCIAL

## 2019-07-18 DIAGNOSIS — K51.30 ULCERATIVE RECTOSIGMOIDITIS WITHOUT COMPLICATION (HCC): ICD-10-CM

## 2019-07-18 PROCEDURE — 83993 ASSAY FOR CALPROTECTIN FECAL: CPT

## 2019-07-22 LAB — CALPROTECTIN, FECAL: 82 UG/G

## 2019-07-23 ENCOUNTER — OFFICE VISIT (OUTPATIENT)
Dept: FAMILY MEDICINE CLINIC | Facility: CLINIC | Age: 64
End: 2019-07-23
Payer: COMMERCIAL

## 2019-07-23 VITALS
TEMPERATURE: 98 F | RESPIRATION RATE: 20 BRPM | WEIGHT: 213 LBS | HEART RATE: 70 BPM | BODY MASS INDEX: 38.22 KG/M2 | SYSTOLIC BLOOD PRESSURE: 144 MMHG | HEIGHT: 62.75 IN | DIASTOLIC BLOOD PRESSURE: 80 MMHG

## 2019-07-23 DIAGNOSIS — S93.421A SPRAIN OF RIGHT MEDIAL ANKLE JOINT, INITIAL ENCOUNTER: Primary | ICD-10-CM

## 2019-07-23 PROCEDURE — 99213 OFFICE O/P EST LOW 20 MIN: CPT | Performed by: INTERNAL MEDICINE

## 2019-07-23 RX ORDER — MELOXICAM 15 MG/1
15 TABLET ORAL DAILY
Qty: 14 TABLET | Refills: 0 | Status: SHIPPED | OUTPATIENT
Start: 2019-07-23 | End: 2019-08-06

## 2019-07-23 RX ORDER — MELOXICAM 15 MG/1
TABLET ORAL
Qty: 90 TABLET | Refills: 0 | OUTPATIENT
Start: 2019-07-23

## 2019-07-23 NOTE — PROGRESS NOTES
Alan Tyson is a 66year old female. HPI:   Right medial ankle pain. She has had pain for 3 weeks, wearing flip flops to water, but has for 30 years. She just started wearing a brace last week. Has orthotics for pronation in her tennis shoes today.   PRAKASH Years: 10.00        Pack years: 8        Quit date: 1979        Years since quittin.2      Smokeless tobacco: Never Used    Alcohol use:  Yes      Alcohol/week: 2.5 standard drinks      Types: 3 Glasses of wine per week      Comment: sometimes a

## 2019-08-20 ENCOUNTER — HOSPITAL ENCOUNTER (OUTPATIENT)
Dept: CT IMAGING | Age: 64
Discharge: HOME | End: 2019-08-20
Attending: INTERNAL MEDICINE
Payer: COMMERCIAL

## 2019-08-20 DIAGNOSIS — R19.4 CHANGE IN BOWEL HABIT: ICD-10-CM

## 2019-08-20 DIAGNOSIS — R10.9 LEFT SIDED ABDOMINAL PAIN: ICD-10-CM

## 2019-08-20 DIAGNOSIS — K51.819 OTHER ULCERATIVE COLITIS WITH COMPLICATION (HCC): ICD-10-CM

## 2019-08-20 LAB — CREAT BLD-MCNC: 0.8 MG/DL (ref 0.55–1.02)

## 2019-08-20 PROCEDURE — 82565 ASSAY OF CREATININE: CPT

## 2019-08-20 PROCEDURE — 74177 CT ABD & PELVIS W/CONTRAST: CPT | Performed by: INTERNAL MEDICINE

## 2019-08-21 ENCOUNTER — LABORATORY ENCOUNTER (OUTPATIENT)
Dept: LAB | Age: 64
End: 2019-08-21
Attending: INTERNAL MEDICINE
Payer: COMMERCIAL

## 2019-08-21 ENCOUNTER — TELEPHONE (OUTPATIENT)
Dept: FAMILY MEDICINE CLINIC | Facility: CLINIC | Age: 64
End: 2019-08-21

## 2019-08-21 DIAGNOSIS — K51.90 ULCERATIVE COLITIS WITHOUT COMPLICATIONS, UNSPECIFIED LOCATION (HCC): ICD-10-CM

## 2019-08-21 DIAGNOSIS — R19.4 CHANGE IN BOWEL HABIT: ICD-10-CM

## 2019-08-21 DIAGNOSIS — K51.819 OTHER ULCERATIVE COLITIS WITH COMPLICATION (HCC): ICD-10-CM

## 2019-08-21 DIAGNOSIS — R10.9 LEFT SIDED ABDOMINAL PAIN: ICD-10-CM

## 2019-08-21 LAB
ALBUMIN SERPL-MCNC: 3.5 G/DL (ref 3.4–5)
ALBUMIN/GLOB SERPL: 0.9 {RATIO} (ref 1–2)
ALP LIVER SERPL-CCNC: 86 U/L (ref 50–130)
ALT SERPL-CCNC: 16 U/L (ref 13–56)
ANION GAP SERPL CALC-SCNC: 6 MMOL/L (ref 0–18)
AST SERPL-CCNC: 11 U/L (ref 15–37)
BASOPHILS # BLD AUTO: 0.07 X10(3) UL (ref 0–0.2)
BASOPHILS NFR BLD AUTO: 0.6 %
BILIRUB SERPL-MCNC: 0.4 MG/DL (ref 0.1–2)
BUN BLD-MCNC: 10 MG/DL (ref 7–18)
BUN/CREAT SERPL: 11.8 (ref 10–20)
CALCIUM BLD-MCNC: 9.2 MG/DL (ref 8.5–10.1)
CHLORIDE SERPL-SCNC: 105 MMOL/L (ref 98–112)
CO2 SERPL-SCNC: 28 MMOL/L (ref 21–32)
CREAT BLD-MCNC: 0.85 MG/DL (ref 0.55–1.02)
CRP SERPL-MCNC: 1.3 MG/DL (ref ?–0.3)
DEPRECATED RDW RBC AUTO: 38.5 FL (ref 35.1–46.3)
EOSINOPHIL # BLD AUTO: 0.15 X10(3) UL (ref 0–0.7)
EOSINOPHIL NFR BLD AUTO: 1.3 %
ERYTHROCYTE [DISTWIDTH] IN BLOOD BY AUTOMATED COUNT: 12.1 % (ref 11–15)
GLOBULIN PLAS-MCNC: 4.1 G/DL (ref 2.8–4.4)
GLUCOSE BLD-MCNC: 102 MG/DL (ref 70–99)
HCT VFR BLD AUTO: 39.6 % (ref 35–48)
HGB BLD-MCNC: 13.1 G/DL (ref 12–16)
IMM GRANULOCYTES # BLD AUTO: 0.07 X10(3) UL (ref 0–1)
IMM GRANULOCYTES NFR BLD: 0.6 %
LYMPHOCYTES # BLD AUTO: 2.31 X10(3) UL (ref 1–4)
LYMPHOCYTES NFR BLD AUTO: 19.8 %
M PROTEIN MFR SERPL ELPH: 7.6 G/DL (ref 6.4–8.2)
MCH RBC QN AUTO: 28.7 PG (ref 26–34)
MCHC RBC AUTO-ENTMCNC: 33.1 G/DL (ref 31–37)
MCV RBC AUTO: 86.8 FL (ref 80–100)
MONOCYTES # BLD AUTO: 0.86 X10(3) UL (ref 0.1–1)
MONOCYTES NFR BLD AUTO: 7.4 %
NEUTROPHILS # BLD AUTO: 8.19 X10 (3) UL (ref 1.5–7.7)
NEUTROPHILS # BLD AUTO: 8.19 X10(3) UL (ref 1.5–7.7)
NEUTROPHILS NFR BLD AUTO: 70.3 %
OSMOLALITY SERPL CALC.SUM OF ELEC: 287 MOSM/KG (ref 275–295)
PLATELET # BLD AUTO: 290 10(3)UL (ref 150–450)
POTASSIUM SERPL-SCNC: 3.8 MMOL/L (ref 3.5–5.1)
RBC # BLD AUTO: 4.56 X10(6)UL (ref 3.8–5.3)
SED RATE-ML: 19 MM/HR (ref 0–25)
SODIUM SERPL-SCNC: 139 MMOL/L (ref 136–145)
WBC # BLD AUTO: 11.7 X10(3) UL (ref 4–11)

## 2019-08-21 PROCEDURE — 85652 RBC SED RATE AUTOMATED: CPT

## 2019-08-21 PROCEDURE — 80053 COMPREHEN METABOLIC PANEL: CPT

## 2019-08-21 PROCEDURE — 86140 C-REACTIVE PROTEIN: CPT

## 2019-08-21 PROCEDURE — 36415 COLL VENOUS BLD VENIPUNCTURE: CPT

## 2019-08-21 PROCEDURE — 85025 COMPLETE CBC W/AUTO DIFF WBC: CPT

## 2019-08-21 NOTE — TELEPHONE ENCOUNTER
Last Labs done for Dr Niyah Escalona was Lipid and TSH/free T4 on 3/26/19. She also had a CMP,sed rate,CBC, and C-reactive Protein done at this time for Dr Guillermo Niño.

## 2019-08-21 NOTE — TELEPHONE ENCOUNTER
AFSHIN HAS BLOOD WORK ORDERED BY DR Berl Kehr, SHE ONLY WANTS TO GET BLOOD WORK DONE ONCE. DOES DR Eloy Wolfe NEED ANY BLOOD WORK FOR HER TOO?   IF SO, PLEASE PLACE ORDER

## 2019-09-05 ENCOUNTER — APPOINTMENT (OUTPATIENT)
Dept: LAB | Age: 64
End: 2019-09-05
Attending: INTERNAL MEDICINE
Payer: COMMERCIAL

## 2019-09-05 DIAGNOSIS — R19.7 DIARRHEA, UNSPECIFIED TYPE: ICD-10-CM

## 2019-09-05 PROCEDURE — 87493 C DIFF AMPLIFIED PROBE: CPT

## 2019-09-05 RX ORDER — LEVOTHYROXINE SODIUM 0.07 MG/1
TABLET ORAL
Qty: 90 TABLET | Refills: 2 | Status: SHIPPED | OUTPATIENT
Start: 2019-09-05 | End: 2020-06-12

## 2019-09-06 LAB — C DIFF TOX B STL QL: NEGATIVE

## 2019-09-16 ENCOUNTER — APPOINTMENT (OUTPATIENT)
Dept: LAB | Age: 64
End: 2019-09-16
Attending: INTERNAL MEDICINE
Payer: COMMERCIAL

## 2019-09-16 DIAGNOSIS — K51.90 MILD CHRONIC ULCERATIVE COLITIS WITHOUT COMPLICATION (HCC): ICD-10-CM

## 2019-09-16 LAB
HAV IGM SER QL: NONREACTIVE
HBV CORE IGM SER QL: NONREACTIVE
HBV SURFACE AG SERPL QL IA: NONREACTIVE
HCV AB SERPL QL IA: NONREACTIVE

## 2019-09-16 PROCEDURE — 80074 ACUTE HEPATITIS PANEL: CPT

## 2019-09-16 PROCEDURE — 36415 COLL VENOUS BLD VENIPUNCTURE: CPT

## 2019-09-16 PROCEDURE — 82657 ENZYME CELL ACTIVITY: CPT

## 2019-09-16 PROCEDURE — 86480 TB TEST CELL IMMUN MEASURE: CPT

## 2019-09-19 LAB — THIOPURINE METHYLTRANSFERASE: 29.6 U/ML

## 2019-09-20 LAB
M TB TUBERC IFN-G BLD QL: NEGATIVE
M TB TUBERC IFN-G/MITOGEN IGNF BLD: 0.01 IU/ML
M TB TUBERC IFN-G/MITOGEN IGNF BLD: 0.19 IU/ML
M TB TUBERC IGNF/MITOGEN IGNF CONTROL: 0.67 IU/ML
MITOGEN IGNF BCKGRD COR BLD-ACNC: 0.6 IU/ML

## 2019-10-08 ENCOUNTER — PATIENT MESSAGE (OUTPATIENT)
Dept: FAMILY MEDICINE CLINIC | Facility: CLINIC | Age: 64
End: 2019-10-08

## 2019-10-08 ENCOUNTER — OFFICE VISIT (OUTPATIENT)
Dept: FAMILY MEDICINE CLINIC | Facility: CLINIC | Age: 64
End: 2019-10-08
Payer: COMMERCIAL

## 2019-10-08 VITALS
OXYGEN SATURATION: 96 % | BODY MASS INDEX: 36 KG/M2 | TEMPERATURE: 99 F | HEART RATE: 71 BPM | DIASTOLIC BLOOD PRESSURE: 82 MMHG | WEIGHT: 203 LBS | SYSTOLIC BLOOD PRESSURE: 142 MMHG

## 2019-10-08 DIAGNOSIS — Z00.00 WELL ADULT EXAM: Primary | ICD-10-CM

## 2019-10-08 DIAGNOSIS — K51.819 OTHER ULCERATIVE COLITIS WITH COMPLICATION (HCC): ICD-10-CM

## 2019-10-08 DIAGNOSIS — E03.9 ACQUIRED HYPOTHYROIDISM: ICD-10-CM

## 2019-10-08 DIAGNOSIS — Z23 NEED FOR VACCINATION: ICD-10-CM

## 2019-10-08 PROCEDURE — 90686 IIV4 VACC NO PRSV 0.5 ML IM: CPT | Performed by: INTERNAL MEDICINE

## 2019-10-08 PROCEDURE — 90472 IMMUNIZATION ADMIN EACH ADD: CPT | Performed by: INTERNAL MEDICINE

## 2019-10-08 PROCEDURE — 99396 PREV VISIT EST AGE 40-64: CPT | Performed by: INTERNAL MEDICINE

## 2019-10-08 PROCEDURE — 90732 PPSV23 VACC 2 YRS+ SUBQ/IM: CPT | Performed by: INTERNAL MEDICINE

## 2019-10-08 PROCEDURE — 90471 IMMUNIZATION ADMIN: CPT | Performed by: INTERNAL MEDICINE

## 2019-10-08 RX ORDER — UBIDECARENONE/VITAMIN E MIXED 100 MG-100
CAPSULE ORAL
COMMUNITY
Start: 2019-09-20 | End: 2020-10-13

## 2019-10-08 NOTE — PROGRESS NOTES
HPI:   Ifeanyi Villa is a 60year old female who presents for a complete physical exam. Symptoms: denies discharge, itching, burning or dysuria, is menopausal. Patient complains of UC not in remission, she is currently on prednisone 30 mg daily.  She needs Coenzyme Q10 (COQ10) 100 MG Oral Cap  Disp:  Rfl:    LEVOTHYROXINE SODIUM 75 MCG Oral Tab TAKE 1 TABLET(75 MCG) BY MOUTH BEFORE BREAKFAST Disp: 90 tablet Rfl: 2   Lactobacillus (PROBIOTIC ACIDOPHILUS) Oral Cap Take 30,000,000 Units by mouth daily.  Disp: History:   Procedure Laterality Date   •      • CARPAL TUNNEL RELEASE     • COLONOSCOPY     • SIGMOIDOSCOPY,DIAGNOSTIC        Family History   Problem Relation Age of Onset   • Heart Disorder Father    • Hypertension Father         age 48   • [de-identified] adenopathy,no bruits  CHEST: no chest tenderness  BREAST: deferred  LUNGS: clear to auscultation  CARDIO: RRR without murmur  GI: good BS's,no masses, HSM or tenderness  :deferred   RECTAL: deferred  MUSCULOSKELETAL: back is not tender,FROM of the back

## 2019-10-09 NOTE — TELEPHONE ENCOUNTER
From: Mahnaz Linda  To: Louis Pizarro MD  Sent: 10/8/2019 8:25 PM CDT  Subject: Visit Follow-up Question    Hi! I noticed that today's visit only shows I only received the Pneumonia vaccine. I had a flu vaccine as well. please update my file accordingly.

## 2019-10-24 ENCOUNTER — APPOINTMENT (OUTPATIENT)
Dept: LAB | Age: 64
End: 2019-10-24
Attending: INTERNAL MEDICINE
Payer: COMMERCIAL

## 2019-10-24 DIAGNOSIS — K51.80 OTHER ULCERATIVE COLITIS WITHOUT COMPLICATION (HCC): ICD-10-CM

## 2019-10-24 PROCEDURE — 86706 HEP B SURFACE ANTIBODY: CPT

## 2019-10-24 PROCEDURE — 36415 COLL VENOUS BLD VENIPUNCTURE: CPT

## 2019-10-24 PROCEDURE — 86708 HEPATITIS A ANTIBODY: CPT

## 2019-10-24 PROCEDURE — 86704 HEP B CORE ANTIBODY TOTAL: CPT

## 2019-10-25 ENCOUNTER — TELEPHONE (OUTPATIENT)
Dept: FAMILY MEDICINE CLINIC | Facility: CLINIC | Age: 64
End: 2019-10-25

## 2019-10-25 NOTE — TELEPHONE ENCOUNTER
Patient calling asking if she received Hepatitis A vaccine. Advised that patient did receive and complete the series.

## 2019-10-28 ENCOUNTER — TELEPHONE (OUTPATIENT)
Dept: FAMILY MEDICINE CLINIC | Facility: CLINIC | Age: 64
End: 2019-10-28

## 2019-10-28 NOTE — TELEPHONE ENCOUNTER
Patient advised Dr Roni Ponce is aware and ordered first dose, advised the earliest she can get the second dose 6 months later.  Nurse appointment scheduled tomorrow at 11am.

## 2019-10-29 ENCOUNTER — NURSE ONLY (OUTPATIENT)
Dept: FAMILY MEDICINE CLINIC | Facility: CLINIC | Age: 64
End: 2019-10-29
Payer: COMMERCIAL

## 2019-10-29 PROCEDURE — 90471 IMMUNIZATION ADMIN: CPT | Performed by: INTERNAL MEDICINE

## 2019-10-29 PROCEDURE — 90632 HEPA VACCINE ADULT IM: CPT | Performed by: INTERNAL MEDICINE

## 2019-11-18 ENCOUNTER — OFFICE VISIT (OUTPATIENT)
Dept: FAMILY MEDICINE CLINIC | Facility: CLINIC | Age: 64
End: 2019-11-18
Payer: COMMERCIAL

## 2019-11-18 VITALS
DIASTOLIC BLOOD PRESSURE: 90 MMHG | HEIGHT: 63 IN | BODY MASS INDEX: 35.26 KG/M2 | WEIGHT: 199 LBS | RESPIRATION RATE: 18 BRPM | SYSTOLIC BLOOD PRESSURE: 144 MMHG | TEMPERATURE: 98 F | HEART RATE: 72 BPM

## 2019-11-18 DIAGNOSIS — J01.00 ACUTE NON-RECURRENT MAXILLARY SINUSITIS: Primary | ICD-10-CM

## 2019-11-18 PROCEDURE — 99214 OFFICE O/P EST MOD 30 MIN: CPT | Performed by: INTERNAL MEDICINE

## 2019-11-18 RX ORDER — CIPROFLOXACIN 500 MG/1
500 TABLET, FILM COATED ORAL 2 TIMES DAILY
Qty: 14 TABLET | Refills: 0 | Status: SHIPPED | OUTPATIENT
Start: 2019-11-18 | End: 2019-11-18

## 2019-11-18 NOTE — PROGRESS NOTES
HPI:   James Colby is a 60year old female who presents for upper respiratory symptoms for  2  weeks. Patient reports dry cough, sinus pain.     Current Outpatient Medications   Medication Sig Dispense Refill   • Turmeric Curcumin 500 MG Oral Cap      • C Stress I do not remember   • Ulcerative colitis ([facility] [address]    • Ulcerative colitis ([facility]    • Vitamin D deficiency    • Wears glasses 1982    reading glasses   • Weight loss 2 wks ago      Past Surgical History:   Procedure Laterality Date   •      • C with Sinusitis. PLAN: OTC decongestants, throat lozenges and tylenol and echinacia and mucinex dm. The patient indicates understanding of these issues and agrees to the plan. The patient is asked to return if sx's persist or worsen.

## 2019-11-25 ENCOUNTER — OFFICE VISIT (OUTPATIENT)
Dept: FAMILY MEDICINE CLINIC | Facility: CLINIC | Age: 64
End: 2019-11-25
Payer: COMMERCIAL

## 2019-11-25 ENCOUNTER — TELEPHONE (OUTPATIENT)
Dept: FAMILY MEDICINE CLINIC | Facility: CLINIC | Age: 64
End: 2019-11-25

## 2019-11-25 VITALS
HEART RATE: 70 BPM | WEIGHT: 196 LBS | SYSTOLIC BLOOD PRESSURE: 130 MMHG | TEMPERATURE: 99 F | DIASTOLIC BLOOD PRESSURE: 70 MMHG | BODY MASS INDEX: 35 KG/M2

## 2019-11-25 DIAGNOSIS — B34.9 VIRAL SYNDROME: Primary | ICD-10-CM

## 2019-11-25 PROCEDURE — 99214 OFFICE O/P EST MOD 30 MIN: CPT | Performed by: INTERNAL MEDICINE

## 2019-11-25 NOTE — TELEPHONE ENCOUNTER
Jayda wanted to let you know that she has the flonase so she does not need you to call it in.   Thank you

## 2019-11-25 NOTE — PROGRESS NOTES
HPI:   Dorothy Reese is a 60year old female who presents for upper respiratory symptoms for  3  weeks. Patient reports congestion, dry cough.     Current Outpatient Medications   Medication Sig Dispense Refill   • MESALAMINE 800 MG Oral Tab EC TAKE 3 TABLE Stress I do not remember   • Ulcerative colitis ([facility]    • Ulcerative colitis ([facility]    • Vitamin D deficiency    • Wears glasses 1982    reading glasses   • Weight loss 2 wks ago      Past Surgical History:   Procedure Laterality Date   •      • C PLAN: OTC decongestants, throat lozenges and tylenol and flonase each nostril BID. The patient indicates understanding of these issues and agrees to the plan. The patient is asked to return if sx's persist or worsen.

## 2019-12-16 ENCOUNTER — TELEPHONE (OUTPATIENT)
Dept: FAMILY MEDICINE CLINIC | Facility: CLINIC | Age: 64
End: 2019-12-16

## 2019-12-16 NOTE — TELEPHONE ENCOUNTER
Patient is too early for the second Hep A, last Hep A was 10/29/19. It should be 6-12 months apart. Please place order. Patient advised, appointment scheduled for May. Patient said she is going to see Dr Christoph Costa at the end of January.  She asked if Dr Ana Landeros

## 2020-01-14 ENCOUNTER — TELEPHONE (OUTPATIENT)
Dept: FAMILY MEDICINE CLINIC | Facility: CLINIC | Age: 65
End: 2020-01-14

## 2020-01-14 DIAGNOSIS — Z13.220 SCREENING FOR CHOLESTEROL LEVEL: Primary | ICD-10-CM

## 2020-01-14 NOTE — TELEPHONE ENCOUNTER
Chio Smith is has to come in for bloodwork for another Dr and she was wondering if Dr Zeeshan Canchola needed any labs done? Please call and let her know.

## 2020-01-14 NOTE — TELEPHONE ENCOUNTER
Last TSH, lipids done 3/26/19. Last CMP done 8/21/19. Orders in Epic for C-reactive protein and Calprotectin from Dr Christoph Costa.

## 2020-01-20 ENCOUNTER — TELEPHONE (OUTPATIENT)
Dept: FAMILY MEDICINE CLINIC | Facility: CLINIC | Age: 65
End: 2020-01-20

## 2020-01-20 NOTE — TELEPHONE ENCOUNTER
Patient is coming in for labs tomorrow and thinks she may also be due for a VIT D & TSH lab and maybe others. Please check and give patient a call back to advise.

## 2020-01-21 ENCOUNTER — APPOINTMENT (OUTPATIENT)
Dept: LAB | Age: 65
End: 2020-01-21
Attending: INTERNAL MEDICINE
Payer: COMMERCIAL

## 2020-01-21 DIAGNOSIS — Z13.220 SCREENING FOR CHOLESTEROL LEVEL: ICD-10-CM

## 2020-01-21 DIAGNOSIS — K51.819 OTHER ULCERATIVE COLITIS WITH COMPLICATION (HCC): ICD-10-CM

## 2020-01-21 LAB
CHOLEST SMN-MCNC: 219 MG/DL (ref ?–200)
CRP SERPL-MCNC: <0.29 MG/DL (ref ?–0.3)
HDLC SERPL-MCNC: 87 MG/DL (ref 40–59)
LDLC SERPL CALC-MCNC: 114 MG/DL (ref ?–100)
NONHDLC SERPL-MCNC: 132 MG/DL (ref ?–130)
PATIENT FASTING Y/N/NP: YES
TRIGL SERPL-MCNC: 92 MG/DL (ref 30–149)
VLDLC SERPL CALC-MCNC: 18 MG/DL (ref 0–30)

## 2020-01-21 PROCEDURE — 36415 COLL VENOUS BLD VENIPUNCTURE: CPT | Performed by: INTERNAL MEDICINE

## 2020-01-21 PROCEDURE — 80061 LIPID PANEL: CPT | Performed by: INTERNAL MEDICINE

## 2020-01-23 LAB — CALPROTECTIN STL-MCNT: 20 ΜG/G (ref ?–50)

## 2020-04-04 NOTE — TELEPHONE ENCOUNTER
Last refill 60g on 9/11/18  Last office visit on 11/25/19  Future Appointments   Date Time Provider Fany Cavazos   5/20/2020  2:00 PM [facility] NURSE [facility] [place]   6/29/2020 11:00 AM REF [facility] REF [facility] Ref Lab [facility]

## 2020-04-04 NOTE — TELEPHONE ENCOUNTER
DESONIDE 0.05 % External Cream    This is a steroid cream that she only uses PRN for her Eczema. [facility] IN [place]    PATIENT IS AWARE THAT DR Danyel Armendariz IS OUT OF THE OFFICE AND SAID THAT DR Kalyn De La Torre KNOWS HER WELL.  DOESN'T THINK SHE WILL NEED A TELE VIS

## 2020-04-06 RX ORDER — DESONIDE 0.5 MG/G
CREAM TOPICAL
Qty: 60 G | Refills: 0 | Status: SHIPPED | OUTPATIENT
Start: 2020-04-06 | End: 2020-10-13

## 2020-05-27 ENCOUNTER — NURSE ONLY (OUTPATIENT)
Dept: FAMILY MEDICINE CLINIC | Facility: CLINIC | Age: 65
End: 2020-05-27
Payer: COMMERCIAL

## 2020-05-27 PROCEDURE — 90471 IMMUNIZATION ADMIN: CPT | Performed by: INTERNAL MEDICINE

## 2020-05-27 PROCEDURE — 90632 HEPA VACCINE ADULT IM: CPT | Performed by: INTERNAL MEDICINE

## 2020-06-12 RX ORDER — LEVOTHYROXINE SODIUM 0.07 MG/1
TABLET ORAL
Qty: 90 TABLET | Refills: 2 | Status: SHIPPED | OUTPATIENT
Start: 2020-06-12 | End: 2021-03-05

## 2020-06-12 NOTE — TELEPHONE ENCOUNTER
Last OV 11/18/19    Last refill 9/5/2019 LEVOTHYROXINE  Sodium 75 MG # 90 x 2 refill      Last Lab 1/21/2020      Thyroid Supplements Protocol Failed6/12 1:17 PM   TSH test in past 12 months    TSH value between 0.350 and 5.500 IU/ml    Appointment in past

## 2020-06-15 ENCOUNTER — VIRTUAL PHONE E/M (OUTPATIENT)
Dept: FAMILY MEDICINE CLINIC | Facility: CLINIC | Age: 65
End: 2020-06-15
Payer: COMMERCIAL

## 2020-06-15 DIAGNOSIS — E03.9 ACQUIRED HYPOTHYROIDISM: Primary | ICD-10-CM

## 2020-06-15 DIAGNOSIS — Z00.00 WELL ADULT EXAM: ICD-10-CM

## 2020-06-15 PROCEDURE — 99213 OFFICE O/P EST LOW 20 MIN: CPT | Performed by: INTERNAL MEDICINE

## 2020-06-15 NOTE — PROGRESS NOTES
Yaquelin Reid is a 60year old female. HPI:   Happy woth her treatment for UC and a little unhappy woth her weight gain. Reports she is \"kitten smitten\", its been 2 years since her husbands death and she for the first time is getting comfortable.   Eating by mouth. • CALCIUM CITRATE OR by Does not apply route. • Multiple Vitamin (MULTIVITAMIN OR) Take  by mouth.         Past Medical History:   Diagnosis Date   • Belching    • Blood in the stool 1 wk ago   • Diarrhea, unspecified 2 wks ago   • Eczema No prescriptions requested or ordered in this encounter       Imaging & Consults:  None    Follow up as needed. The patient indicates understanding of these issues and agrees to the plan.

## 2020-06-29 ENCOUNTER — LABORATORY ENCOUNTER (OUTPATIENT)
Dept: LAB | Age: 65
End: 2020-06-29
Attending: INTERNAL MEDICINE
Payer: COMMERCIAL

## 2020-06-29 DIAGNOSIS — E03.9 ACQUIRED HYPOTHYROIDISM: ICD-10-CM

## 2020-06-29 DIAGNOSIS — Z00.00 WELL ADULT EXAM: ICD-10-CM

## 2020-06-29 DIAGNOSIS — K51.819 OTHER ULCERATIVE COLITIS WITH COMPLICATION (HCC): ICD-10-CM

## 2020-06-29 LAB
ALBUMIN SERPL-MCNC: 3.8 G/DL (ref 3.4–5)
ALBUMIN/GLOB SERPL: 1 {RATIO} (ref 1–2)
ALP LIVER SERPL-CCNC: 81 U/L (ref 50–130)
ALT SERPL-CCNC: 16 U/L (ref 13–56)
ANION GAP SERPL CALC-SCNC: 7 MMOL/L (ref 0–18)
AST SERPL-CCNC: 11 U/L (ref 15–37)
BASOPHILS # BLD AUTO: 0.04 X10(3) UL (ref 0–0.2)
BASOPHILS NFR BLD AUTO: 0.5 %
BILIRUB SERPL-MCNC: 0.4 MG/DL (ref 0.1–2)
BUN BLD-MCNC: 13 MG/DL (ref 7–18)
BUN/CREAT SERPL: 16.9 (ref 10–20)
CALCIUM BLD-MCNC: 9 MG/DL (ref 8.5–10.1)
CHLORIDE SERPL-SCNC: 106 MMOL/L (ref 98–112)
CHOLEST SMN-MCNC: 211 MG/DL (ref ?–200)
CO2 SERPL-SCNC: 27 MMOL/L (ref 21–32)
CREAT BLD-MCNC: 0.77 MG/DL (ref 0.55–1.02)
DEPRECATED RDW RBC AUTO: 40.7 FL (ref 35.1–46.3)
EOSINOPHIL # BLD AUTO: 0.09 X10(3) UL (ref 0–0.7)
EOSINOPHIL NFR BLD AUTO: 1.2 %
ERYTHROCYTE [DISTWIDTH] IN BLOOD BY AUTOMATED COUNT: 12.5 % (ref 11–15)
GLOBULIN PLAS-MCNC: 3.8 G/DL (ref 2.8–4.4)
GLUCOSE BLD-MCNC: 99 MG/DL (ref 70–99)
HCT VFR BLD AUTO: 40.6 % (ref 35–48)
HDLC SERPL-MCNC: 76 MG/DL (ref 40–59)
HGB BLD-MCNC: 13 G/DL (ref 12–16)
IMM GRANULOCYTES # BLD AUTO: 0.02 X10(3) UL (ref 0–1)
IMM GRANULOCYTES NFR BLD: 0.3 %
LDLC SERPL CALC-MCNC: 120 MG/DL (ref ?–100)
LYMPHOCYTES # BLD AUTO: 1.63 X10(3) UL (ref 1–4)
LYMPHOCYTES NFR BLD AUTO: 22.2 %
M PROTEIN MFR SERPL ELPH: 7.6 G/DL (ref 6.4–8.2)
MCH RBC QN AUTO: 28.6 PG (ref 26–34)
MCHC RBC AUTO-ENTMCNC: 32 G/DL (ref 31–37)
MCV RBC AUTO: 89.2 FL (ref 80–100)
MONOCYTES # BLD AUTO: 0.47 X10(3) UL (ref 0.1–1)
MONOCYTES NFR BLD AUTO: 6.4 %
NEUTROPHILS # BLD AUTO: 5.08 X10 (3) UL (ref 1.5–7.7)
NEUTROPHILS # BLD AUTO: 5.08 X10(3) UL (ref 1.5–7.7)
NEUTROPHILS NFR BLD AUTO: 69.4 %
NONHDLC SERPL-MCNC: 135 MG/DL (ref ?–130)
OSMOLALITY SERPL CALC.SUM OF ELEC: 290 MOSM/KG (ref 275–295)
PATIENT FASTING Y/N/NP: YES
PATIENT FASTING Y/N/NP: YES
PLATELET # BLD AUTO: 237 10(3)UL (ref 150–450)
POTASSIUM SERPL-SCNC: 4 MMOL/L (ref 3.5–5.1)
RBC # BLD AUTO: 4.55 X10(6)UL (ref 3.8–5.3)
SODIUM SERPL-SCNC: 140 MMOL/L (ref 136–145)
T4 FREE SERPL-MCNC: 1.2 NG/DL (ref 0.8–1.7)
TRIGL SERPL-MCNC: 77 MG/DL (ref 30–149)
TSI SER-ACNC: 0.77 MIU/ML (ref 0.36–3.74)
VLDLC SERPL CALC-MCNC: 15 MG/DL (ref 0–30)
WBC # BLD AUTO: 7.3 X10(3) UL (ref 4–11)

## 2020-06-29 PROCEDURE — 80061 LIPID PANEL: CPT | Performed by: INTERNAL MEDICINE

## 2020-06-29 PROCEDURE — 36415 COLL VENOUS BLD VENIPUNCTURE: CPT | Performed by: INTERNAL MEDICINE

## 2020-06-29 PROCEDURE — 80230 DRUG ASSAY INFLIXIMAB: CPT | Performed by: INTERNAL MEDICINE

## 2020-06-29 PROCEDURE — 84439 ASSAY OF FREE THYROXINE: CPT | Performed by: INTERNAL MEDICINE

## 2020-06-29 PROCEDURE — 80050 GENERAL HEALTH PANEL: CPT | Performed by: INTERNAL MEDICINE

## 2020-06-29 PROCEDURE — 82397 CHEMILUMINESCENT ASSAY: CPT | Performed by: INTERNAL MEDICINE

## 2020-07-02 ENCOUNTER — TELEPHONE (OUTPATIENT)
Dept: FAMILY MEDICINE CLINIC | Facility: CLINIC | Age: 65
End: 2020-07-02

## 2020-07-02 NOTE — TELEPHONE ENCOUNTER
Patient said that she heard that the [facility] had closed last week due to 2 staff being Covid Positive. She said she was there there the day before they closed dining outside.  She said she does not have any symptoms but wants to know if she should just w

## 2020-07-06 LAB
VEDOLIZUMAB AB, SERUM: <9.8 NG/ML
VEDOLIZUMAB QN, SERUM: 16.9 MCG/ML

## 2020-07-15 ENCOUNTER — APPOINTMENT (OUTPATIENT)
Dept: LAB | Age: 65
End: 2020-07-15
Attending: INTERNAL MEDICINE
Payer: COMMERCIAL

## 2020-07-15 PROCEDURE — 83993 ASSAY FOR CALPROTECTIN FECAL: CPT

## 2020-07-23 LAB — CALPROTECTIN STL-MCNT: 27.1 ΜG/G (ref ?–50)

## 2020-09-15 RX ORDER — NYSTATIN 100000 [USP'U]/G
POWDER TOPICAL
Qty: 60 G | Refills: 0 | Status: SHIPPED | OUTPATIENT
Start: 2020-09-15 | End: 2021-02-08

## 2020-09-15 NOTE — TELEPHONE ENCOUNTER
Last office visit:  6/15/2020 (Virtual)  Last refill: 12/24/2018  Requested Prescriptions     Pending Prescriptions Disp Refills   • Nystatin (NYSTOP) 179715 UNIT/GM External Powder 60 g 0     Future Appointments   Date Time Provider Fany Cavazos   9/

## 2020-09-19 ENCOUNTER — APPOINTMENT (OUTPATIENT)
Dept: LAB | Age: 65
End: 2020-09-19
Attending: INTERNAL MEDICINE
Payer: COMMERCIAL

## 2020-09-19 DIAGNOSIS — Z01.818 PRE-OP TESTING: ICD-10-CM

## 2020-09-20 LAB — SARS-COV-2 RNA RESP QL NAA+PROBE: NOT DETECTED

## 2020-10-07 ENCOUNTER — LAB ENCOUNTER (OUTPATIENT)
Dept: LAB | Age: 65
End: 2020-10-07
Attending: INTERNAL MEDICINE
Payer: COMMERCIAL

## 2020-10-07 DIAGNOSIS — K51.30 ULCERATIVE RECTOSIGMOIDITIS WITHOUT COMPLICATION (HCC): ICD-10-CM

## 2020-10-07 PROCEDURE — 86480 TB TEST CELL IMMUN MEASURE: CPT

## 2020-10-07 PROCEDURE — 36415 COLL VENOUS BLD VENIPUNCTURE: CPT

## 2020-10-13 ENCOUNTER — OFFICE VISIT (OUTPATIENT)
Dept: FAMILY MEDICINE CLINIC | Facility: CLINIC | Age: 65
End: 2020-10-13
Payer: COMMERCIAL

## 2020-10-13 VITALS
BODY MASS INDEX: 33.84 KG/M2 | HEART RATE: 76 BPM | WEIGHT: 191 LBS | HEIGHT: 63 IN | TEMPERATURE: 98 F | SYSTOLIC BLOOD PRESSURE: 128 MMHG | RESPIRATION RATE: 16 BRPM | OXYGEN SATURATION: 96 % | DIASTOLIC BLOOD PRESSURE: 82 MMHG

## 2020-10-13 DIAGNOSIS — E03.9 ACQUIRED HYPOTHYROIDISM: ICD-10-CM

## 2020-10-13 DIAGNOSIS — K51.00 ULCERATIVE PANCOLITIS WITHOUT COMPLICATION (HCC): ICD-10-CM

## 2020-10-13 DIAGNOSIS — Z00.00 WELL ADULT EXAM: Primary | ICD-10-CM

## 2020-10-13 DIAGNOSIS — J30.2 SEASONAL ALLERGIC RHINITIS, UNSPECIFIED TRIGGER: ICD-10-CM

## 2020-10-13 PROCEDURE — 3008F BODY MASS INDEX DOCD: CPT | Performed by: INTERNAL MEDICINE

## 2020-10-13 PROCEDURE — 99214 OFFICE O/P EST MOD 30 MIN: CPT | Performed by: INTERNAL MEDICINE

## 2020-10-13 PROCEDURE — 3074F SYST BP LT 130 MM HG: CPT | Performed by: INTERNAL MEDICINE

## 2020-10-13 PROCEDURE — 90686 IIV4 VACC NO PRSV 0.5 ML IM: CPT | Performed by: INTERNAL MEDICINE

## 2020-10-13 PROCEDURE — 90471 IMMUNIZATION ADMIN: CPT | Performed by: INTERNAL MEDICINE

## 2020-10-13 PROCEDURE — 3079F DIAST BP 80-89 MM HG: CPT | Performed by: INTERNAL MEDICINE

## 2020-10-13 RX ORDER — DESONIDE 0.5 MG/G
CREAM TOPICAL
Qty: 60 G | Refills: 0 | Status: SHIPPED | OUTPATIENT
Start: 2020-10-13

## 2020-10-13 NOTE — PROGRESS NOTES
HPI:   Yosvany Bales is a 63year old female who presents for a complete physical exam. Symptoms: denies discharge, itching, burning or dysuria, is menopausal. Patient complains of nothing, she is doing great on Entyvio. She has been losing weight.  Carson roque ALT 17 03/26/2019       Current Outpatient Medications   Medication Sig Dispense Refill   • Nystatin (NYSTOP) 167731 UNIT/GM External Powder APPLY 1 APPLICATION TOPICALLY NIGHTLY TO INGUINAL AREA BILATERALLY.  60 g 0   • PEG 3350-KCl-NaBcb-NaCl-NaSulf (P • Wears glasses     reading glasses   • Weight loss 2 wks ago      Past Surgical History:   Procedure Laterality Date   •      • CARPAL TUNNEL RELEASE     • COLONOSCOPY     • SIGMOIDOSCOPY,DIAGNOSTIC        Family History   Problem Relation lesions  HEENT: atraumatic, normocephalic,ears and throat are clear  EYES:PERRLA, EOMI, normal optic disk,conjunctiva are clear  NECK: supple,no adenopathy,no bruits  CHEST: no chest tenderness  BREAST: deferred  LUNGS: clear to auscultation  CARDIO: RRR w

## 2020-11-19 ENCOUNTER — TELEPHONE (OUTPATIENT)
Dept: FAMILY MEDICINE CLINIC | Facility: CLINIC | Age: 65
End: 2020-11-19

## 2020-11-19 NOTE — TELEPHONE ENCOUNTER
When was her last contact with her son and how intinmate ( duration, masked/unmasked) needs 5 days at the [facility] before testing and when tested quarantine until the test comes back or just quarentine 14 days if she had known exposure (ie her son is pos

## 2020-11-19 NOTE — TELEPHONE ENCOUNTER
Patient said she was in the car with him yesterday unmasked. Advised that she should quarantine until son gets test results back. If he is positive then she should be tested 5 days after contact with him.

## 2020-11-19 NOTE — TELEPHONE ENCOUNTER
Michael Doll is calling her son's roommate tested positive for COVID today, she has been around her son and she is wondering if and when she should get tested.   Michael Doll is not having any symptoms please advise

## 2020-11-19 NOTE — TELEPHONE ENCOUNTER
Jayda said her son's roommate tested positive yesterday, he was asymptomatic but was tested for work. Her son was tested today. She said she works in a  should she go to work? When should she get tested?

## 2020-11-20 NOTE — TELEPHONE ENCOUNTER
Pl fax a note to her work that doctor recommended she stay home until test results come back, fax# [phone] attn: irineo trammell.

## 2020-11-23 NOTE — TELEPHONE ENCOUNTER
She can go back to work if her son has tested negative the first time and last contact was 14 days ago. She does not have to wait for a second test.  He does have to wait; she was not in the environment with the COVID positive person.

## 2020-11-23 NOTE — TELEPHONE ENCOUNTER
Patient said that she is supposed to go back to work next Monday. Her son was tested last Thursday and his roommate tested positive on Wed, she was in contact with her son Wed.  Her son started with mild body aches Saturday night but nothing else so she wan

## 2020-11-24 NOTE — TELEPHONE ENCOUNTER
Patient advised. She said that her last contact with him was last Wed and he tested negative Thursday. He is getting retested today. She is supposed to go back to work Monday. Does she need to quarantine right now.

## 2020-12-14 ENCOUNTER — HOSPITAL ENCOUNTER (OUTPATIENT)
Dept: MAMMOGRAPHY | Age: 65
Discharge: HOME | End: 2020-12-14
Attending: INTERNAL MEDICINE
Payer: COMMERCIAL

## 2020-12-14 DIAGNOSIS — Z00.00 WELL ADULT EXAM: ICD-10-CM

## 2020-12-14 PROCEDURE — 77067 SCR MAMMO BI INCL CAD: CPT | Performed by: INTERNAL MEDICINE

## 2020-12-31 ENCOUNTER — TELEPHONE (OUTPATIENT)
Dept: FAMILY MEDICINE CLINIC | Facility: CLINIC | Age: 65
End: 2020-12-31

## 2020-12-31 NOTE — TELEPHONE ENCOUNTER
Patient is having external vaginal itching. No discharge. Patient is using miconazole 2% cream x 2 days with no relief. She is asking for a possible vaginal suppository or any ther otc recommendations as she does not wish to have diflucan.   Patient stat

## 2020-12-31 NOTE — TELEPHONE ENCOUNTER
Patient has a feminine issue going on that she has been treating with OTC medications for 48 hours and it is not better. Would like to speak with a nurse for some direction on treatment.

## 2021-01-29 ENCOUNTER — TELEPHONE (OUTPATIENT)
Dept: FAMILY MEDICINE CLINIC | Facility: CLINIC | Age: 66
End: 2021-01-29

## 2021-01-29 RX ORDER — FLUCONAZOLE 100 MG/1
100 TABLET ORAL DAILY
Qty: 14 TABLET | Refills: 0 | Status: SHIPPED | OUTPATIENT
Start: 2021-01-29 | End: 2021-02-12

## 2021-01-29 NOTE — TELEPHONE ENCOUNTER
Patient states that she has yeasty rash under her arms, groin and vaginal area. The areas are warm and moist and itchy. This has been an ongoing issue. She has stopped the use of her deodorant stone with no relief.   Area that itches the most are the out

## 2021-01-29 NOTE — TELEPHONE ENCOUNTER
Yes, oral antifungals can help. I suugest for maintaining normal skin danika, use mild soaps no preethi or antibacterial. Dry completely before dressing, may consider a medicated powder (like lotrimin powder).

## 2021-02-03 PROBLEM — S63.501A RIGHT WRIST SPRAIN, INITIAL ENCOUNTER: Status: ACTIVE | Noted: 2020-11-03

## 2021-02-03 PROBLEM — M18.11 ARTHRITIS OF CARPOMETACARPAL (CMC) JOINT OF RIGHT THUMB: Status: ACTIVE | Noted: 2020-11-03

## 2021-02-04 ENCOUNTER — OFFICE VISIT (OUTPATIENT)
Dept: FAMILY MEDICINE CLINIC | Facility: CLINIC | Age: 66
End: 2021-02-04
Payer: COMMERCIAL

## 2021-02-04 ENCOUNTER — TELEPHONE (OUTPATIENT)
Dept: FAMILY MEDICINE CLINIC | Facility: CLINIC | Age: 66
End: 2021-02-04

## 2021-02-04 ENCOUNTER — HOSPITAL ENCOUNTER (OUTPATIENT)
Dept: GENERAL RADIOLOGY | Age: 66
Discharge: HOME | End: 2021-02-04
Attending: INTERNAL MEDICINE
Payer: COMMERCIAL

## 2021-02-04 VITALS
RESPIRATION RATE: 18 BRPM | HEART RATE: 74 BPM | SYSTOLIC BLOOD PRESSURE: 122 MMHG | HEIGHT: 63 IN | WEIGHT: 176 LBS | OXYGEN SATURATION: 96 % | DIASTOLIC BLOOD PRESSURE: 70 MMHG | BODY MASS INDEX: 31.18 KG/M2 | TEMPERATURE: 97 F

## 2021-02-04 DIAGNOSIS — M89.319 CLAVICULAR ENLARGEMENT: ICD-10-CM

## 2021-02-04 DIAGNOSIS — M89.319 CLAVICULAR ENLARGEMENT: Primary | ICD-10-CM

## 2021-02-04 PROCEDURE — 3074F SYST BP LT 130 MM HG: CPT | Performed by: INTERNAL MEDICINE

## 2021-02-04 PROCEDURE — 3008F BODY MASS INDEX DOCD: CPT | Performed by: INTERNAL MEDICINE

## 2021-02-04 PROCEDURE — 73000 X-RAY EXAM OF COLLAR BONE: CPT | Performed by: INTERNAL MEDICINE

## 2021-02-04 PROCEDURE — 3078F DIAST BP <80 MM HG: CPT | Performed by: INTERNAL MEDICINE

## 2021-02-04 PROCEDURE — 99214 OFFICE O/P EST MOD 30 MIN: CPT | Performed by: INTERNAL MEDICINE

## 2021-02-04 RX ORDER — HYOSCYAMINE SULFATE 0.125 MG
0.12 TABLET ORAL DAILY
COMMUNITY
End: 2021-09-30

## 2021-02-04 NOTE — TELEPHONE ENCOUNTER
Patient advised. She said she is icing it but only taking Tylenol. She is hesitant to take NSAIDS. She has tried Arneca gel but it doesn't help. She does have Diclofenac topical. She asked where she goes from here. Should she refrain from shoveling snow?

## 2021-02-04 NOTE — PROGRESS NOTES
Malik Gleason is a 63year old female. HPI:   Pt has been doctoring a tendonitis in the right arm for a few months. She has been feeling pain in the right proximal clavicular-sternal joint. She has been icing and trying to take it easy. Huts to push on. appetite 2 wks ago   • Menopausal state    • Obesity, unspecified    • Osteoarthritis    • Pain in joints 4 yrs ago    upon kneeling   • Pain with bowel movements    • Rash 1962    chronic eczema   • Stool incontinence 2 wks ago   • Stress I do not remembe encounter. Meds & Refills for this Visit:  Requested Prescriptions      No prescriptions requested or ordered in this encounter       Imaging & Consults:  None    Follow up as needed.      The patient indicates understanding of these issues and agrees

## 2021-02-04 NOTE — TELEPHONE ENCOUNTER
Patient advised to continue what she has been doing and avoid shoveling.  V.O. Dr Geovanni Damico RN

## 2021-02-04 NOTE — TELEPHONE ENCOUNTER
On the distal, not the medial (toward the center where her pain is) there is arthrotos changes. SO inflammatory like her chiropractor suggested.

## 2021-02-05 ENCOUNTER — MED REC SCAN ONLY (OUTPATIENT)
Dept: FAMILY MEDICINE CLINIC | Facility: CLINIC | Age: 66
End: 2021-02-05

## 2021-02-08 ENCOUNTER — PATIENT MESSAGE (OUTPATIENT)
Dept: FAMILY MEDICINE CLINIC | Facility: CLINIC | Age: 66
End: 2021-02-08

## 2021-02-08 RX ORDER — NYSTATIN 100000 [USP'U]/G
POWDER TOPICAL
Qty: 60 G | Refills: 0 | Status: SHIPPED | OUTPATIENT
Start: 2021-02-08 | End: 2021-04-13

## 2021-02-09 RX ORDER — MESALAMINE 800 MG/1
TABLET, DELAYED RELEASE ORAL
Qty: 360 TABLET | Refills: 3 | COMMUNITY
Start: 2021-02-09 | End: 2021-09-30

## 2021-02-09 NOTE — TELEPHONE ENCOUNTER
From: Fiorella Aleman  To: Adelita Gonzales MD  Sent: 2/8/2021 7:30 PM CST  Subject: Non-Urgent Medical Question    Hello! In My Chart for some unknown reason to me I can not change the medication dosage information and update it.  I have been on Half the dose of

## 2021-03-05 RX ORDER — LEVOTHYROXINE SODIUM 0.07 MG/1
TABLET ORAL
Qty: 90 TABLET | Refills: 0 | Status: SHIPPED | OUTPATIENT
Start: 2021-03-05 | End: 2021-06-11

## 2021-03-05 NOTE — TELEPHONE ENCOUNTER
Gege Ochoa refill #90 x 2 on 6/12/2020  Last office visit pertaining to refill on 10/1/ - cpx  No future appointments.   Last TSH on 6/29/2020  REFILLED PER PROTOCOL

## 2021-03-18 ENCOUNTER — TELEPHONE (OUTPATIENT)
Dept: FAMILY MEDICINE CLINIC | Facility: CLINIC | Age: 66
End: 2021-03-18

## 2021-04-07 ENCOUNTER — TELEPHONE (OUTPATIENT)
Dept: FAMILY MEDICINE CLINIC | Facility: CLINIC | Age: 66
End: 2021-04-07

## 2021-04-07 RX ORDER — DOXYCYCLINE HYCLATE 100 MG
100 TABLET ORAL 2 TIMES DAILY
Qty: 20 TABLET | Refills: 0 | Status: SHIPPED | OUTPATIENT
Start: 2021-04-07 | End: 2021-04-17

## 2021-04-07 NOTE — TELEPHONE ENCOUNTER
Patient said that she has had a rash for \"months\". She said that she had tried Diflucan and is using Nystatin she said she has itching along the hairline and groin. She said she is cleaning and drying and using Cetaphil.  She said that she is frustrated a

## 2021-04-13 RX ORDER — NYSTATIN 100000 [USP'U]/G
POWDER TOPICAL
Qty: 60 G | Refills: 0 | Status: SHIPPED | OUTPATIENT
Start: 2021-04-13 | End: 2021-06-11

## 2021-05-03 ENCOUNTER — TELEPHONE (OUTPATIENT)
Dept: FAMILY MEDICINE CLINIC | Facility: CLINIC | Age: 66
End: 2021-05-03

## 2021-05-03 DIAGNOSIS — Z13.220 SCREENING FOR CHOLESTEROL LEVEL: ICD-10-CM

## 2021-05-03 DIAGNOSIS — E03.9 ACQUIRED HYPOTHYROIDISM: Primary | ICD-10-CM

## 2021-05-03 NOTE — TELEPHONE ENCOUNTER
PT HAS 9AM LAB APPT HERE TOMORROW FOR DR Zee Woodard, IF DR BERKOWITZ WANTS ANY LABS DONE LET PT KNOW & PUT ORDERS IN COMPUTER

## 2021-05-04 ENCOUNTER — LAB ENCOUNTER (OUTPATIENT)
Dept: LAB | Age: 66
End: 2021-05-04
Attending: INTERNAL MEDICINE
Payer: COMMERCIAL

## 2021-05-04 DIAGNOSIS — K51.30 ULCERATIVE RECTOSIGMOIDITIS WITHOUT COMPLICATION (HCC): ICD-10-CM

## 2021-05-04 DIAGNOSIS — Z13.220 SCREENING FOR CHOLESTEROL LEVEL: ICD-10-CM

## 2021-05-04 DIAGNOSIS — E03.9 ACQUIRED HYPOTHYROIDISM: ICD-10-CM

## 2021-05-04 PROCEDURE — 84439 ASSAY OF FREE THYROXINE: CPT | Performed by: INTERNAL MEDICINE

## 2021-05-04 PROCEDURE — 80061 LIPID PANEL: CPT | Performed by: INTERNAL MEDICINE

## 2021-05-04 PROCEDURE — 84443 ASSAY THYROID STIM HORMONE: CPT | Performed by: INTERNAL MEDICINE

## 2021-06-08 ENCOUNTER — OFFICE VISIT (OUTPATIENT)
Dept: FAMILY MEDICINE CLINIC | Facility: CLINIC | Age: 66
End: 2021-06-08
Payer: COMMERCIAL

## 2021-06-08 VITALS
WEIGHT: 175 LBS | OXYGEN SATURATION: 94 % | TEMPERATURE: 97 F | SYSTOLIC BLOOD PRESSURE: 140 MMHG | HEART RATE: 102 BPM | RESPIRATION RATE: 16 BRPM | HEIGHT: 63 IN | BODY MASS INDEX: 31.01 KG/M2 | DIASTOLIC BLOOD PRESSURE: 82 MMHG

## 2021-06-08 DIAGNOSIS — S43.82XA SPRAIN OF LEFT TRAPEZOID LIGAMENT, INITIAL ENCOUNTER: ICD-10-CM

## 2021-06-08 DIAGNOSIS — S13.9XXA NECK SPRAIN, INITIAL ENCOUNTER: Primary | ICD-10-CM

## 2021-06-08 PROCEDURE — 3079F DIAST BP 80-89 MM HG: CPT | Performed by: INTERNAL MEDICINE

## 2021-06-08 PROCEDURE — 3008F BODY MASS INDEX DOCD: CPT | Performed by: INTERNAL MEDICINE

## 2021-06-08 PROCEDURE — 99213 OFFICE O/P EST LOW 20 MIN: CPT | Performed by: INTERNAL MEDICINE

## 2021-06-08 PROCEDURE — 3077F SYST BP >= 140 MM HG: CPT | Performed by: INTERNAL MEDICINE

## 2021-06-08 NOTE — PROGRESS NOTES
Deepika Klein is a 63year old female. HPI:   Pt has been having pain in the left neck, shoulder for a month. She thinks it is from gardening and fell on her right wrist about 6 months ago and is still wearing a splint.  She has been doctoring with her Walter Hernandez Obesity, unspecified    • Osteoarthritis    • Pain in joints 4 yrs ago    upon kneeling   • Pain with bowel movements    • Rash 1962    chronic eczema   • Stool incontinence 2 wks ago   • Stress I do not remember   • Ulcerative colitis ([facility]    • Javy Wilcox a more natural route and referred for myofascial release. No orders of the defined types were placed in this encounter.       Meds & Refills for this Visit:  Requested Prescriptions      No prescriptions requested or ordered in this encounter       Brando

## 2021-06-11 RX ORDER — NYSTATIN 100000 [USP'U]/G
POWDER TOPICAL
Qty: 60 G | Refills: 0 | Status: SHIPPED | OUTPATIENT
Start: 2021-06-11 | End: 2021-08-07

## 2021-06-11 RX ORDER — LEVOTHYROXINE SODIUM 0.07 MG/1
TABLET ORAL
Qty: 90 TABLET | Refills: 1 | Status: SHIPPED | OUTPATIENT
Start: 2021-06-11 | End: 2021-12-06

## 2021-06-11 NOTE — TELEPHONE ENCOUNTER
Last refill on nystatin 60g on 4/13/2021  Last refill on levothyroxine #90 on 3/5/2021  Last office visit pertaining to refills on 10/13/2020  Future Appointments   Date Time Provider Fany Cavazos   6/17/2021 10:00 AM Emely Yao MD [facility]

## 2021-06-17 ENCOUNTER — OFFICE VISIT (OUTPATIENT)
Dept: FAMILY MEDICINE CLINIC | Facility: CLINIC | Age: 66
End: 2021-06-17
Payer: COMMERCIAL

## 2021-06-17 VITALS
WEIGHT: 174.25 LBS | DIASTOLIC BLOOD PRESSURE: 84 MMHG | BODY MASS INDEX: 31 KG/M2 | SYSTOLIC BLOOD PRESSURE: 132 MMHG | TEMPERATURE: 99 F

## 2021-06-17 DIAGNOSIS — M54.2 NECK PAIN ON LEFT SIDE: Primary | ICD-10-CM

## 2021-06-17 DIAGNOSIS — F43.9 STRESS AT HOME: ICD-10-CM

## 2021-06-17 DIAGNOSIS — M54.6 ACUTE LEFT-SIDED THORACIC BACK PAIN: ICD-10-CM

## 2021-06-17 PROCEDURE — 98929 OSTEOPATH MANJ 9-10 REGIONS: CPT | Performed by: FAMILY MEDICINE

## 2021-06-17 PROCEDURE — 99214 OFFICE O/P EST MOD 30 MIN: CPT | Performed by: FAMILY MEDICINE

## 2021-06-17 PROCEDURE — 3075F SYST BP GE 130 - 139MM HG: CPT | Performed by: FAMILY MEDICINE

## 2021-06-17 PROCEDURE — 3079F DIAST BP 80-89 MM HG: CPT | Performed by: FAMILY MEDICINE

## 2021-06-17 NOTE — PROGRESS NOTES
HPI/Subjective:   Patient ID: Mukul Alfaro is a 63year old female. Patient with complaints of left-sided neck pain. Upper back pain. Symptoms over the last month. Denies specific trauma. Right-handed. Stress high. Denies numbness, tingling.   HPI Palpations: Abdomen is soft. Musculoskeletal:      Cervical back: Normal range of motion and neck supple. Tenderness present. Lymphadenopathy:      Cervical: No cervical adenopathy. Skin:     General: Skin is warm and dry.    Neurological:      Genera

## 2021-08-07 RX ORDER — NYSTATIN 100000 [USP'U]/G
POWDER TOPICAL
Qty: 60 G | Refills: 0 | Status: SHIPPED | OUTPATIENT
Start: 2021-08-07 | End: 2021-09-20

## 2021-08-07 NOTE — TELEPHONE ENCOUNTER
LOV 6/17/21 with Dr. Meera Cyr    LAST LAB 5/4/21    LAST RX 6/11/21 60 G /0rf    Next OV    Future Appointments   Date Time Provider Fany Cavazos   11/10/2021  7:30 AM Marvin Churchill MD [facility] GI           PROTOCOL

## 2021-09-20 RX ORDER — NYSTATIN 100000 [USP'U]/G
POWDER TOPICAL
Qty: 60 G | Refills: 0 | Status: SHIPPED | OUTPATIENT
Start: 2021-09-20 | End: 2021-10-26

## 2021-09-20 NOTE — TELEPHONE ENCOUNTER
Last refill: 08/07/21  Qty: 60 g  W/ 0 refills  Last ov: 06/17/21    Requested Prescriptions     Pending Prescriptions Disp Refills   • Nystatin (NYSTOP) 484003 UNIT/GM External Powder [Pharmacy Med Name: NYSTOP TOP PWDR 100,000 60GM] 60 g 0     Sig: APPLY

## 2021-10-18 ENCOUNTER — TELEPHONE (OUTPATIENT)
Dept: FAMILY MEDICINE CLINIC | Facility: CLINIC | Age: 66
End: 2021-10-18

## 2021-10-19 ENCOUNTER — OFFICE VISIT (OUTPATIENT)
Dept: FAMILY MEDICINE CLINIC | Facility: CLINIC | Age: 66
End: 2021-10-19
Payer: COMMERCIAL

## 2021-10-19 VITALS
RESPIRATION RATE: 18 BRPM | DIASTOLIC BLOOD PRESSURE: 70 MMHG | OXYGEN SATURATION: 97 % | HEIGHT: 63.5 IN | TEMPERATURE: 99 F | BODY MASS INDEX: 31.92 KG/M2 | HEART RATE: 72 BPM | WEIGHT: 182.38 LBS | SYSTOLIC BLOOD PRESSURE: 118 MMHG

## 2021-10-19 DIAGNOSIS — Z00.00 WELL ADULT EXAM: ICD-10-CM

## 2021-10-19 DIAGNOSIS — Z23 NEED FOR VACCINATION: Primary | ICD-10-CM

## 2021-10-19 DIAGNOSIS — B37.9 YEAST INFECTION: ICD-10-CM

## 2021-10-19 PROCEDURE — 3074F SYST BP LT 130 MM HG: CPT | Performed by: INTERNAL MEDICINE

## 2021-10-19 PROCEDURE — 88175 CYTOPATH C/V AUTO FLUID REDO: CPT | Performed by: INTERNAL MEDICINE

## 2021-10-19 PROCEDURE — 90686 IIV4 VACC NO PRSV 0.5 ML IM: CPT | Performed by: INTERNAL MEDICINE

## 2021-10-19 PROCEDURE — 90471 IMMUNIZATION ADMIN: CPT | Performed by: INTERNAL MEDICINE

## 2021-10-19 PROCEDURE — 3078F DIAST BP <80 MM HG: CPT | Performed by: INTERNAL MEDICINE

## 2021-10-19 PROCEDURE — 99396 PREV VISIT EST AGE 40-64: CPT | Performed by: INTERNAL MEDICINE

## 2021-10-19 PROCEDURE — 3008F BODY MASS INDEX DOCD: CPT | Performed by: INTERNAL MEDICINE

## 2021-10-21 ENCOUNTER — PATIENT MESSAGE (OUTPATIENT)
Dept: FAMILY MEDICINE CLINIC | Facility: CLINIC | Age: 66
End: 2021-10-21

## 2021-10-21 NOTE — TELEPHONE ENCOUNTER
From: Rupinder Ceja  To: Samm Braun MD  Sent: 10/21/2021 4:43 PM CDT  Subject: Referral to Dr. Hira Tamez, Dermatology    Dr. Hanh Mancilla:   Dr. Ella Chester has NO appts. (at [address] available until February.  I do not think 4 months is even close to

## 2021-10-26 RX ORDER — NYSTATIN 100000 [USP'U]/G
POWDER TOPICAL
Qty: 60 G | Refills: 0 | Status: SHIPPED | OUTPATIENT
Start: 2021-10-26

## 2021-10-26 NOTE — TELEPHONE ENCOUNTER
Last refill: 09/20/21  Qty: 60 g  W/ 0 refills  Last ov: 10/19/21    Requested Prescriptions     Pending Prescriptions Disp Refills   • Nystatin (NYSTOP) [id number] UNIT/GM External Powder [Pharmacy Med Name: NYSTOP TOP PWDR 100,000 60GM] 60 g 0     Sig: APPLY

## 2021-11-03 ENCOUNTER — TELEPHONE (OUTPATIENT)
Dept: FAMILY MEDICINE CLINIC | Facility: CLINIC | Age: 66
End: 2021-11-03

## 2021-11-03 NOTE — TELEPHONE ENCOUNTER
Patient advised, does not want to use this preparation because she does not have any pain. While we were talking she was researching various preparations for their active ingredients.  Recommended robitussin DM, when she looked that up she decided that was

## 2021-11-03 NOTE — TELEPHONE ENCOUNTER
Patient developed a cough on Saturday. She believes it is just a cold. She works in a  & a few children had rsv & covid on Friday.   She will go & get a rapid test done but she wanted to know what over the counter medication  recommends for her c

## 2021-12-06 RX ORDER — LEVOTHYROXINE SODIUM 0.07 MG/1
TABLET ORAL
Qty: 90 TABLET | Refills: 1 | Status: SHIPPED | OUTPATIENT
Start: 2021-12-06

## 2021-12-06 NOTE — TELEPHONE ENCOUNTER
Last refill: 06/11/21  QtY: 90  W/ 1 refills  Last ov: 10/19/21    Requested Prescriptions     Pending Prescriptions Disp Refills   • LEVOTHYROXINE 75 MCG Oral Tab [Pharmacy Med Name: LEVOTHYROXINE 0.075MG (75MCG) TABS] 90 tablet 1     Sig: TAKE 1 TABLET(7

## 2022-02-08 ENCOUNTER — LAB ENCOUNTER (OUTPATIENT)
Dept: LAB | Age: 67
End: 2022-02-08
Attending: INTERNAL MEDICINE
Payer: COMMERCIAL

## 2022-02-08 DIAGNOSIS — K51.00 ULCERATIVE PANCOLITIS WITHOUT COMPLICATION (HCC): ICD-10-CM

## 2022-02-08 LAB
ALBUMIN SERPL-MCNC: 4 G/DL (ref 3.4–5)
ALBUMIN/GLOB SERPL: 1.1 {RATIO} (ref 1–2)
ALP LIVER SERPL-CCNC: 106 U/L
ALT SERPL-CCNC: 20 U/L
ANION GAP SERPL CALC-SCNC: 5 MMOL/L (ref 0–18)
AST SERPL-CCNC: 15 U/L (ref 15–37)
BASOPHILS # BLD AUTO: 0.05 X10(3) UL (ref 0–0.2)
BASOPHILS NFR BLD AUTO: 0.6 %
BILIRUB SERPL-MCNC: 0.4 MG/DL (ref 0.1–2)
BUN BLD-MCNC: 14 MG/DL (ref 7–18)
CALCIUM BLD-MCNC: 9.8 MG/DL (ref 8.5–10.1)
CHLORIDE SERPL-SCNC: 105 MMOL/L (ref 98–112)
CO2 SERPL-SCNC: 30 MMOL/L (ref 21–32)
CREAT BLD-MCNC: 0.71 MG/DL
CRP SERPL-MCNC: <0.29 MG/DL (ref ?–0.3)
EOSINOPHIL # BLD AUTO: 0.13 X10(3) UL (ref 0–0.7)
EOSINOPHIL NFR BLD AUTO: 1.4 %
ERYTHROCYTE [DISTWIDTH] IN BLOOD BY AUTOMATED COUNT: 12.3 %
ERYTHROCYTE [SEDIMENTATION RATE] IN BLOOD: 12 MM/HR
FASTING STATUS PATIENT QL REPORTED: NO
GLOBULIN PLAS-MCNC: 3.6 G/DL (ref 2.8–4.4)
GLUCOSE BLD-MCNC: 91 MG/DL (ref 70–99)
HCT VFR BLD AUTO: 41.2 %
HGB BLD-MCNC: 13.7 G/DL
IMM GRANULOCYTES # BLD AUTO: 0.02 X10(3) UL (ref 0–1)
IMM GRANULOCYTES NFR BLD: 0.2 %
LYMPHOCYTES # BLD AUTO: 2.01 X10(3) UL (ref 1–4)
LYMPHOCYTES NFR BLD AUTO: 22.2 %
MCH RBC QN AUTO: 29.4 PG (ref 26–34)
MCHC RBC AUTO-ENTMCNC: 33.3 G/DL (ref 31–37)
MCV RBC AUTO: 88.4 FL
MONOCYTES # BLD AUTO: 0.72 X10(3) UL (ref 0.1–1)
MONOCYTES NFR BLD AUTO: 8 %
NEUTROPHILS # BLD AUTO: 6.11 X10 (3) UL (ref 1.5–7.7)
NEUTROPHILS # BLD AUTO: 6.11 X10(3) UL (ref 1.5–7.7)
NEUTROPHILS NFR BLD AUTO: 67.6 %
OSMOLALITY SERPL CALC.SUM OF ELEC: 290 MOSM/KG (ref 275–295)
PLATELET # BLD AUTO: 227 10(3)UL (ref 150–450)
POTASSIUM SERPL-SCNC: 3.8 MMOL/L (ref 3.5–5.1)
PROT SERPL-MCNC: 7.6 G/DL (ref 6.4–8.2)
RBC # BLD AUTO: 4.66 X10(6)UL
SODIUM SERPL-SCNC: 140 MMOL/L (ref 136–145)
WBC # BLD AUTO: 9 X10(3) UL (ref 4–11)

## 2022-02-08 PROCEDURE — 86480 TB TEST CELL IMMUN MEASURE: CPT

## 2022-02-08 PROCEDURE — 86140 C-REACTIVE PROTEIN: CPT

## 2022-02-08 PROCEDURE — 85025 COMPLETE CBC W/AUTO DIFF WBC: CPT

## 2022-02-08 PROCEDURE — 36415 COLL VENOUS BLD VENIPUNCTURE: CPT

## 2022-02-08 PROCEDURE — 85652 RBC SED RATE AUTOMATED: CPT

## 2022-02-08 PROCEDURE — 80053 COMPREHEN METABOLIC PANEL: CPT

## 2022-02-09 ENCOUNTER — TELEPHONE (OUTPATIENT)
Dept: FAMILY MEDICINE CLINIC | Facility: CLINIC | Age: 67
End: 2022-02-09

## 2022-02-09 NOTE — TELEPHONE ENCOUNTER
Pt wants the covid booster (Krystyna Bachelor) shot in her chart. She had it done on 9/27/21.  lot # [id number].

## 2022-02-10 LAB
M TB IFN-G CD4+ T-CELLS BLD-ACNC: 0.34 IU/ML
M TB TUBERC IFN-G BLD QL: NEGATIVE
M TB TUBERC IGNF/MITOGEN IGNF CONTROL: >10 IU/ML
QFT TB1 AG MINUS NIL: 0.27 IU/ML
QFT TB2 AG MINUS NIL: 0.34 IU/ML

## 2022-02-22 ENCOUNTER — PATIENT MESSAGE (OUTPATIENT)
Dept: FAMILY MEDICINE CLINIC | Facility: CLINIC | Age: 67
End: 2022-02-22

## 2022-02-22 NOTE — TELEPHONE ENCOUNTER
From: Shanna Alcocer  To: Sha Garcia MD  Sent: 2/22/2022 1:12 PM CST  Subject: Gilberto Lara Dr. Binta Clayton:   Its been a few years since I have had these screenings. there are 5 in the series they offer. (price $99. economical!) 1. Carotid Artery (Plaque)   2. Heart rhythm (Atrial Fibrillation)   3. Abdominal Aortic Aneurism    4. Peripheral Arterial disease   5. Osteoporosis   Before I sign up, do you feel these screenings be beneficial to me?  Thank you Shanna Alcocer

## 2022-03-09 RX ORDER — LEVOTHYROXINE SODIUM 0.07 MG/1
TABLET ORAL
Qty: 90 TABLET | Refills: 1 | Status: SHIPPED | OUTPATIENT
Start: 2022-03-09

## 2022-03-30 ENCOUNTER — TELEPHONE (OUTPATIENT)
Dept: FAMILY MEDICINE CLINIC | Facility: CLINIC | Age: 67
End: 2022-03-30

## 2022-03-30 NOTE — TELEPHONE ENCOUNTER
Silvia Crabtree is calling she would like to know when her last TSH and Vit D level was she thought that it should have been checked with her last labs please call

## 2022-03-30 NOTE — TELEPHONE ENCOUNTER
Last thyroid was 5/21 and normal vit D lobg time ago!! 2017. I can order them and you can do it NEXT TIME or any time.

## 2022-03-31 ENCOUNTER — LABORATORY ENCOUNTER (OUTPATIENT)
Dept: LAB | Age: 67
End: 2022-03-31
Attending: INTERNAL MEDICINE
Payer: COMMERCIAL

## 2022-03-31 ENCOUNTER — OFFICE VISIT (OUTPATIENT)
Dept: FAMILY MEDICINE CLINIC | Facility: CLINIC | Age: 67
End: 2022-03-31
Payer: COMMERCIAL

## 2022-03-31 VITALS
WEIGHT: 185 LBS | RESPIRATION RATE: 18 BRPM | HEART RATE: 61 BPM | DIASTOLIC BLOOD PRESSURE: 74 MMHG | BODY MASS INDEX: 32 KG/M2 | OXYGEN SATURATION: 99 % | TEMPERATURE: 98 F | SYSTOLIC BLOOD PRESSURE: 130 MMHG

## 2022-03-31 DIAGNOSIS — R53.83 FATIGUE, UNSPECIFIED TYPE: ICD-10-CM

## 2022-03-31 DIAGNOSIS — E55.9 VITAMIN D DEFICIENCY: ICD-10-CM

## 2022-03-31 DIAGNOSIS — J02.9 SORE THROAT: Primary | ICD-10-CM

## 2022-03-31 LAB
CONTROL LINE PRESENT WITH A CLEAR BACKGROUND (YES/NO): YES YES/NO
KIT LOT #: NORMAL NUMERIC
T4 FREE SERPL-MCNC: 1.2 NG/DL (ref 0.8–1.7)
TSI SER-ACNC: 0.98 MIU/ML (ref 0.36–3.74)
VIT D+METAB SERPL-MCNC: 63 NG/ML (ref 30–100)

## 2022-03-31 PROCEDURE — 87880 STREP A ASSAY W/OPTIC: CPT | Performed by: INTERNAL MEDICINE

## 2022-03-31 PROCEDURE — 87081 CULTURE SCREEN ONLY: CPT | Performed by: INTERNAL MEDICINE

## 2022-03-31 PROCEDURE — 3075F SYST BP GE 130 - 139MM HG: CPT | Performed by: INTERNAL MEDICINE

## 2022-03-31 PROCEDURE — 3078F DIAST BP <80 MM HG: CPT | Performed by: INTERNAL MEDICINE

## 2022-03-31 PROCEDURE — 99214 OFFICE O/P EST MOD 30 MIN: CPT | Performed by: INTERNAL MEDICINE

## 2022-03-31 PROCEDURE — 84439 ASSAY OF FREE THYROXINE: CPT | Performed by: INTERNAL MEDICINE

## 2022-03-31 PROCEDURE — 82306 VITAMIN D 25 HYDROXY: CPT | Performed by: INTERNAL MEDICINE

## 2022-03-31 PROCEDURE — 84443 ASSAY THYROID STIM HORMONE: CPT | Performed by: INTERNAL MEDICINE

## 2022-04-01 LAB — SARS-COV-2 RNA RESP QL NAA+PROBE: NOT DETECTED

## 2022-04-04 ENCOUNTER — TELEPHONE (OUTPATIENT)
Dept: FAMILY MEDICINE CLINIC | Facility: CLINIC | Age: 67
End: 2022-04-04

## 2022-04-12 ENCOUNTER — OFFICE VISIT (OUTPATIENT)
Dept: FAMILY MEDICINE CLINIC | Facility: CLINIC | Age: 67
End: 2022-04-12
Payer: COMMERCIAL

## 2022-04-12 ENCOUNTER — MED REC SCAN ONLY (OUTPATIENT)
Dept: FAMILY MEDICINE CLINIC | Facility: CLINIC | Age: 67
End: 2022-04-12

## 2022-04-12 VITALS
DIASTOLIC BLOOD PRESSURE: 84 MMHG | BODY MASS INDEX: 33 KG/M2 | SYSTOLIC BLOOD PRESSURE: 128 MMHG | OXYGEN SATURATION: 96 % | RESPIRATION RATE: 18 BRPM | TEMPERATURE: 98 F | HEART RATE: 83 BPM | WEIGHT: 191 LBS

## 2022-04-12 DIAGNOSIS — J01.00 ACUTE NON-RECURRENT MAXILLARY SINUSITIS: Primary | ICD-10-CM

## 2022-04-12 PROCEDURE — 3074F SYST BP LT 130 MM HG: CPT | Performed by: INTERNAL MEDICINE

## 2022-04-12 PROCEDURE — 99214 OFFICE O/P EST MOD 30 MIN: CPT | Performed by: INTERNAL MEDICINE

## 2022-04-12 PROCEDURE — 3079F DIAST BP 80-89 MM HG: CPT | Performed by: INTERNAL MEDICINE

## 2022-04-12 RX ORDER — PREDNISONE 20 MG/1
40 TABLET ORAL DAILY
Qty: 14 TABLET | Refills: 0 | Status: SHIPPED | OUTPATIENT
Start: 2022-04-12 | End: 2022-04-19

## 2022-04-12 RX ORDER — AMOXICILLIN 500 MG/1
500 TABLET, FILM COATED ORAL 2 TIMES DAILY
Qty: 14 TABLET | Refills: 0 | Status: SHIPPED | OUTPATIENT
Start: 2022-04-12 | End: 2022-04-19

## 2022-08-24 RX ORDER — LEVOTHYROXINE SODIUM 0.07 MG/1
75 TABLET ORAL
Qty: 90 TABLET | Refills: 1 | Status: SHIPPED | OUTPATIENT
Start: 2022-08-24

## 2022-09-12 ENCOUNTER — TELEPHONE (OUTPATIENT)
Dept: FAMILY MEDICINE CLINIC | Facility: CLINIC | Age: 67
End: 2022-09-12

## 2022-09-12 DIAGNOSIS — Z13.220 SCREENING FOR HYPERCHOLESTEROLEMIA: Primary | ICD-10-CM

## 2022-09-12 NOTE — TELEPHONE ENCOUNTER
Pt has an appt with Dr Robbin Kramer in a few weeks. He will have labs for her to do. She wanted to know if  wanted any labs ordered so she can get all her labs done at the same time. Please call to discuss.
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
## 2022-09-14 ENCOUNTER — PATIENT MESSAGE (OUTPATIENT)
Dept: FAMILY MEDICINE CLINIC | Facility: CLINIC | Age: 67
End: 2022-09-14

## 2022-09-14 NOTE — TELEPHONE ENCOUNTER
From: Jake Hartman  To: Mckayla Marr MD  Sent: 9/14/2022 2:21 PM CDT  Subject: srinivasa Rodriguez: We have really had a hard time connecting this time! I am seeing Dr. Sadie Meza in the [facility]. 9-22 prior to my scheduled Colonoscopy early next Month. For Lab work, I would like to be poked one time please. Dr. Yari Nunez has not ordered a TSH or Vitamin D level. I am likely due for those. I did get your msg. on the Lipid profile and 12 hour fast. I am now on Medicare. I assume blood will be drawn on my appt. with Dr. Sadie Meza? please advise.  thank you Jake Hartman

## 2022-09-15 NOTE — TELEPHONE ENCOUNTER
From: Ralf Gupta  To: Erich Ho MD  Sent: 9/14/2022 2:21 PM CDT  Subject: tests     Ramón Ryder: We have really had a hard time connecting this time! I am seeing Dr. Liborio Santos in the [facility]. 9-22 prior to my scheduled Colonoscopy early next Month. For Lab work, I would like to be poked one time please. Dr. uAtumn Larry has not ordered a TSH or Vitamin D level. I am likely due for those. I did get your msg. on the Lipid profile and 12 hour fast. I am now on Medicare. I assume blood will be drawn on my appt. with Dr. Liborio Santos? please advise.  thank you Ralf Gupta

## 2022-09-22 ENCOUNTER — LABORATORY ENCOUNTER (OUTPATIENT)
Dept: LAB | Age: 67
End: 2022-09-22
Attending: INTERNAL MEDICINE

## 2022-09-22 DIAGNOSIS — Z13.220 SCREENING FOR HYPERCHOLESTEROLEMIA: ICD-10-CM

## 2022-09-22 LAB
CHOLEST SERPL-MCNC: 227 MG/DL (ref ?–200)
FASTING PATIENT LIPID ANSWER: YES
HDLC SERPL-MCNC: 98 MG/DL (ref 40–59)
LDLC SERPL CALC-MCNC: 115 MG/DL (ref ?–100)
NONHDLC SERPL-MCNC: 129 MG/DL (ref ?–130)
TRIGL SERPL-MCNC: 83 MG/DL (ref 30–149)
VLDLC SERPL CALC-MCNC: 14 MG/DL (ref 0–30)

## 2022-09-22 PROCEDURE — 80061 LIPID PANEL: CPT

## 2022-09-22 PROCEDURE — 36415 COLL VENOUS BLD VENIPUNCTURE: CPT

## 2022-09-23 ENCOUNTER — TELEPHONE (OUTPATIENT)
Dept: FAMILY MEDICINE CLINIC | Facility: CLINIC | Age: 67
End: 2022-09-23

## 2022-09-23 NOTE — TELEPHONE ENCOUNTER
Patient called and states that she is having urinary frequency that started a little over a week ago. Patient states it is waking her up many times through the night, but has also been more frequent during the day. Patient advised of walk in care as Dr. Gilberto Hernandez is booked today, but patient states she does not feel this is a UTI as she is not having any pain or discomfort. Patient is requesting appointment with Dr. Gilberto Hernandez next week. Is it ok to use SDA appointment on Tuesday at 11:00?

## 2022-09-23 NOTE — TELEPHONE ENCOUNTER
Patient advised. Appointment scheduled.    Future Appointments   Date Time Provider Fany Cavazos   9/27/2022 11:00 AM Mary Bernard MD [facility]   10/4/2022  8:00 AM Clarke Shen MD [facility]

## 2022-09-27 ENCOUNTER — TELEPHONE (OUTPATIENT)
Dept: FAMILY MEDICINE CLINIC | Facility: CLINIC | Age: 67
End: 2022-09-27

## 2022-09-27 ENCOUNTER — OFFICE VISIT (OUTPATIENT)
Dept: FAMILY MEDICINE CLINIC | Facility: CLINIC | Age: 67
End: 2022-09-27

## 2022-09-27 VITALS
DIASTOLIC BLOOD PRESSURE: 70 MMHG | OXYGEN SATURATION: 98 % | BODY MASS INDEX: 33 KG/M2 | RESPIRATION RATE: 18 BRPM | WEIGHT: 192 LBS | HEART RATE: 66 BPM | TEMPERATURE: 98 F | SYSTOLIC BLOOD PRESSURE: 138 MMHG

## 2022-09-27 DIAGNOSIS — R35.0 URINARY FREQUENCY: Primary | ICD-10-CM

## 2022-09-27 LAB
APPEARANCE: CLEAR
BILIRUBIN: NEGATIVE
GLUCOSE (URINE DIPSTICK): NEGATIVE MG/DL
KETONES (URINE DIPSTICK): NEGATIVE MG/DL
LEUKOCYTES: NEGATIVE
MULTISTIX LOT#: NORMAL NUMERIC
NITRITE, URINE: NEGATIVE
OCCULT BLOOD: NEGATIVE
PH, URINE: 7 (ref 4.5–8)
PROTEIN (URINE DIPSTICK): NEGATIVE MG/DL
SPECIFIC GRAVITY: 1.02 (ref 1–1.03)
URINE-COLOR: YELLOW
UROBILINOGEN,SEMI-QN: 0.2 MG/DL (ref 0–1.9)

## 2022-09-27 PROCEDURE — 3075F SYST BP GE 130 - 139MM HG: CPT | Performed by: INTERNAL MEDICINE

## 2022-09-27 PROCEDURE — 81003 URINALYSIS AUTO W/O SCOPE: CPT | Performed by: INTERNAL MEDICINE

## 2022-09-27 PROCEDURE — 87086 URINE CULTURE/COLONY COUNT: CPT | Performed by: INTERNAL MEDICINE

## 2022-09-27 PROCEDURE — 99214 OFFICE O/P EST MOD 30 MIN: CPT | Performed by: INTERNAL MEDICINE

## 2022-09-27 PROCEDURE — 3078F DIAST BP <80 MM HG: CPT | Performed by: INTERNAL MEDICINE

## 2022-09-28 NOTE — TELEPHONE ENCOUNTER
Dr Jadon Osborne left a VM for patient yesterday that we are waiting for urine culture to come back before prescribing treatment.

## 2022-09-29 RX ORDER — OXYBUTYNIN CHLORIDE 5 MG/1
5 TABLET ORAL NIGHTLY
Qty: 30 TABLET | Refills: 0 | Status: SHIPPED | OUTPATIENT
Start: 2022-09-29 | End: 2022-09-30

## 2022-09-30 RX ORDER — OXYBUTYNIN CHLORIDE 5 MG/1
5 TABLET ORAL NIGHTLY
Qty: 90 TABLET | Refills: 0 | Status: SHIPPED | OUTPATIENT
Start: 2022-09-30 | End: 2022-10-30

## 2022-10-04 ENCOUNTER — PATIENT MESSAGE (OUTPATIENT)
Dept: FAMILY MEDICINE CLINIC | Facility: CLINIC | Age: 67
End: 2022-10-04

## 2022-10-04 PROBLEM — K63.5 POLYP OF COLON: Status: ACTIVE | Noted: 2022-10-04

## 2022-10-04 NOTE — TELEPHONE ENCOUNTER
From: Syed Stevenson  To: Haris Toussaint MD  Sent: 10/4/2022 3:36 PM CDT  Subject: [employer] Medical Cann[employer] Card Renewal    Hello! My current card expires 10-10-22 in a few days. What is the status of this? Do you need any further information from me? Card ID # ?  thank Brodie Michael

## 2022-10-06 ENCOUNTER — PATIENT MESSAGE (OUTPATIENT)
Dept: FAMILY MEDICINE CLINIC | Facility: CLINIC | Age: 67
End: 2022-10-06

## 2022-10-06 NOTE — TELEPHONE ENCOUNTER
From: Dale Max  To: Erika Orourke MD  Sent: 10/6/2022 1:23 PM CDT  Subject: [employer] medical Cannabis card renewal    Hello! Ruben Cope .. I went on state website and  must first go into site under Health Care provider renew Certification. ........ [email or web address] to create the certification online and assign it to me. once that is done, send my chart msg. and I will be able to go into site, select the certification from the dropdown menu, renew, payment, etc. thank you!  Dale Max

## 2022-10-15 ENCOUNTER — HOSPITAL ENCOUNTER (OUTPATIENT)
Age: 67
Discharge: HOME | End: 2022-10-15
Payer: MEDICARE

## 2022-10-15 ENCOUNTER — TELEPHONE (OUTPATIENT)
Dept: FAMILY MEDICINE CLINIC | Facility: CLINIC | Age: 67
End: 2022-10-15

## 2022-10-15 VITALS
OXYGEN SATURATION: 96 % | RESPIRATION RATE: 18 BRPM | HEART RATE: 60 BPM | DIASTOLIC BLOOD PRESSURE: 78 MMHG | TEMPERATURE: 97 F | SYSTOLIC BLOOD PRESSURE: 106 MMHG

## 2022-10-15 DIAGNOSIS — S61.451A DOG BITE OF RIGHT HAND, INITIAL ENCOUNTER: Primary | ICD-10-CM
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
## 2022-10-15 PROCEDURE — 99203 OFFICE O/P NEW LOW 30 MIN: CPT | Performed by: NURSE PRACTITIONER

## 2022-10-15 PROCEDURE — 90715 TDAP VACCINE 7 YRS/> IM: CPT | Performed by: NURSE PRACTITIONER

## 2022-10-15 PROCEDURE — 90471 IMMUNIZATION ADMIN: CPT | Performed by: NURSE PRACTITIONER

## 2022-10-15 RX ORDER — AMOXICILLIN AND CLAVULANATE POTASSIUM 875; 125 MG/1; MG/1
1 TABLET, FILM COATED ORAL 2 TIMES DAILY
Qty: 20 TABLET | Refills: 0 | Status: SHIPPED | OUTPATIENT
Start: 2022-10-15 | End: 2022-10-25

## 2022-10-15 NOTE — ED INITIAL ASSESSMENT (HPI)
Puncture wound to right hand last night from unknown dog. No redness or swelling.  Needs a TD
initiation of breastfeeding/breast milk feeding

## 2022-10-15 NOTE — TELEPHONE ENCOUNTER
Pt was bite on her hand by a dog yesterday around 6 pm.  It did break the skin. She called on call  & was advised she needed a tetanus shot.   She wanted to discuss

## 2022-10-15 NOTE — TELEPHONE ENCOUNTER
Pt was bit by dog last night on top of hand, broke the skin, bled, cleaned with soap and water, bandaged it up. Pt was at trunk or treat event and asked to pet a strangers dog, was bit in the hand. Asked the dog owner if the dog is up to date on rabies shots, he verbally said yes, but by the time she went and cleaned her hand up and came back, the dog owner was gone. Pt spoke with on call MD and advised to get a tetanus booster. Last tetanus was 11/4/14. Called to check if [facility] in [place] does tetanus boosters, call center took message and sent to the [facility] directly, they will call pt directly. Notified pt of this, she understands.

## 2022-10-19 ENCOUNTER — TELEPHONE (OUTPATIENT)
Dept: FAMILY MEDICINE CLINIC | Facility: CLINIC | Age: 67
End: 2022-10-19

## 2022-10-22 ENCOUNTER — TELEPHONE (OUTPATIENT)
Dept: FAMILY MEDICINE CLINIC | Facility: CLINIC | Age: 67
End: 2022-10-22

## 2022-10-28 ENCOUNTER — PATIENT MESSAGE (OUTPATIENT)
Dept: FAMILY MEDICINE CLINIC | Facility: CLINIC | Age: 67
End: 2022-10-28

## 2022-10-28 DIAGNOSIS — N39.46 MIXED STRESS AND URGE URINARY INCONTINENCE: Primary | ICD-10-CM

## 2022-10-28 NOTE — TELEPHONE ENCOUNTER
From: Geo Scott  To: Ramin Pleitez MD  Sent: 10/28/2022 9:43 AM CDT  Subject: new med: Oxybutynin    Dr. Melinda Hernandez: I am down to the last few pills and this medication has made NO change in the condition. Zack Lipoma Zack Lipoma I am still getting up to urinate 3-6 times a nite. (nite time more of an issue than during the day. .but hard to plan my life around the darn bathroom!) On to plan B ?. ... Are you familiar and comfortable with treating this or do you want to refer me to a Uro-Gynecologist? Please advise.  Thank you Geo Scott

## 2022-10-31 ENCOUNTER — PATIENT MESSAGE (OUTPATIENT)
Dept: FAMILY MEDICINE CLINIC | Facility: CLINIC | Age: 67
End: 2022-10-31

## 2022-10-31 DIAGNOSIS — R35.0 URINARY FREQUENCY: Primary | ICD-10-CM

## 2022-10-31 NOTE — TELEPHONE ENCOUNTER
From: Dharmesh Coronado  To: Meg Thomas MD  Sent: 10/28/2022 9:43 AM CDT  Subject: new med: Oxybutynin    Dr. Gina Villafana: I am down to the last few pills and this medication has made NO change in the condition. Burt Pineda I am still getting up to urinate 3-6 times a nite. (nite time more of an issue than during the day. .but hard to plan my life around the darn bathroom!) On to plan B ?. ... Are you familiar and comfortable with treating this or do you want to refer me to a Uro-Gynecologist? Please advise.  Thank you Dharmesh Coronado

## 2022-10-31 NOTE — TELEPHONE ENCOUNTER
From: Pearly Media  To: Patricia East MD  Sent: 10/31/2022 1:23 PM CDT  Subject: stopping Oxybutynin    Jasper Dr. Morin Likes: I took last (30) day dose of the Oxybutynin last night. I assume I can just stop the meds? (I'm no better, no worse) Please give me recommendation on your favorite  in the area for this bladder issue.  thank you Tara Todd

## 2022-11-01 ENCOUNTER — PATIENT MESSAGE (OUTPATIENT)
Dept: FAMILY MEDICINE CLINIC | Facility: CLINIC | Age: 67
End: 2022-11-01

## 2022-11-09 ENCOUNTER — PATIENT MESSAGE (OUTPATIENT)
Dept: FAMILY MEDICINE CLINIC | Facility: CLINIC | Age: 67
End: 2022-11-09

## 2022-11-09 RX ORDER — SOLIFENACIN SUCCINATE 10 MG/1
10 TABLET, FILM COATED ORAL DAILY
Qty: 30 TABLET | Refills: 0 | Status: SHIPPED | OUTPATIENT
Start: 2022-11-09 | End: 2022-12-09

## 2022-11-09 NOTE — TELEPHONE ENCOUNTER
From: Serena Kiran  To: Jose Lang MD  Sent: 11/9/2022 9:07 AM CST  Subject: Vesicare prescription    Dr. Keshia Weston: Maria Del Rosario Giordano sent you a Pre Authorization request on Nov. 3rd. I do not know how this works with Medicare Part D. The cost of this drug is Way over the top [employer] brand name. ..$472.00 or $307.69 for Generic) but I found it at [facility] with Good RX card for ONLY $7.11 Do we need to go thru [facility] to get access to this? I have [facility] as my \"preferred\" pharmacy? Please advise.  thank you Serena Kiran

## 2022-12-15 ENCOUNTER — PATIENT MESSAGE (OUTPATIENT)
Dept: FAMILY MEDICINE CLINIC | Facility: CLINIC | Age: 67
End: 2022-12-15

## 2022-12-15 DIAGNOSIS — Z12.31 ENCOUNTER FOR SCREENING MAMMOGRAM FOR MALIGNANT NEOPLASM OF BREAST: Primary | ICD-10-CM

## 2022-12-15 NOTE — TELEPHONE ENCOUNTER
From: Cal Duvall  To: Joon Hernandez MD  Sent: 12/15/2022 1:11 PM CST  Subject: 1. Flu shot 2. Mammogram    Hello. Annamaria Olivares I had my flu shot and Covid booster way back in early Sept. [facility]. Annamaria Olivares [place]. Not sure why you do not have update on that. Also I see I am due for Mammogram. Will you pair Thermography with that? The Accuracy is excellent(large breasts. ..can only flatten em so much!) and can detect a tiny lump way early. Please advise. .. Thank you.  Cal Duvall

## 2023-01-01 LAB — AMB EXT COVID-19 RESULT: DETECTED

## 2023-01-03 ENCOUNTER — TELEPHONE (OUTPATIENT)
Dept: FAMILY MEDICINE CLINIC | Facility: CLINIC | Age: 68
End: 2023-01-03

## 2023-01-03 NOTE — TELEPHONE ENCOUNTER
Pt. Tested positive for covid on Sunday at home test and she has symptoms and she just wants to discuss with nurse what she is doing everything she should be doing.    If NA on land line call her cell# [phone] tylenol and neb tx

## 2023-01-03 NOTE — TELEPHONE ENCOUNTER
Isolate till Thursday, if symptoms improved then may mask in public for another 5 days or continue to isolate for a total of ten. Does work need a note? The fatigue and weakness can last well beyond the drainage and cough.

## 2023-01-03 NOTE — TELEPHONE ENCOUNTER
Patient said that she started with postnasal drip Saturday night and developed a fever Sunday. She took a rapid Covid test and it was positive. She said she has a temp of 99.2 today and loose cough but does not feel \"terrible\". Taking Tylenol and Mucinex DM, drinking tea, using humidifier. She has been taking Vit D3 50,000 IUs for 3 days and Vitamin C 2000. Any other advise?

## 2023-01-12 ENCOUNTER — TELEPHONE (OUTPATIENT)
Dept: FAMILY MEDICINE CLINIC | Facility: CLINIC | Age: 68
End: 2023-01-12

## 2023-01-12 NOTE — TELEPHONE ENCOUNTER
Bin- Just wanted to alert you to the low-lying placenta. Our group has decided to defer discussion re: placenta location (unless complete previa) to the primary Ob. So she has not been informed. We would recommend a repeat ultrasound between 28-32 weeks to reassess placental location. >95% chance of resolution by term. Please contact me with any questions. Thanks, Lisa Patient said that she is feeling ok. She said that she is coughing up thick, sticky mucous she said she is taking 1200mg of Mucinex and it is not helping much. Is there anything else she should take?

## 2023-01-17 ENCOUNTER — HOSPITAL ENCOUNTER (OUTPATIENT)
Dept: MAMMOGRAPHY | Age: 68
Discharge: HOME | End: 2023-01-17
Attending: INTERNAL MEDICINE
Payer: MEDICARE

## 2023-01-17 DIAGNOSIS — Z12.31 ENCOUNTER FOR SCREENING MAMMOGRAM FOR MALIGNANT NEOPLASM OF BREAST: ICD-10-CM

## 2023-01-17 PROCEDURE — 77063 BREAST TOMOSYNTHESIS BI: CPT | Performed by: INTERNAL MEDICINE

## 2023-01-17 PROCEDURE — 77067 SCR MAMMO BI INCL CAD: CPT | Performed by: INTERNAL MEDICINE

## 2023-02-06 ENCOUNTER — TELEPHONE (OUTPATIENT)
Dept: FAMILY MEDICINE CLINIC | Facility: CLINIC | Age: 68
End: 2023-02-06

## 2023-02-06 NOTE — TELEPHONE ENCOUNTER
She needs to give you the update in her since covid. She has a cough that mucinex is not working.    Call her at [phone]

## 2023-02-06 NOTE — TELEPHONE ENCOUNTER
Patient said that she has been taking Mucinex for the last month and she was getting better. She said that she is now \"losing her voice\" and has tightness in her chest and spasms when she coughs. She uses [facility] in [place] but said if Dr Gina Villafana puts her on an antibiotic she has to \"be careful\" due to her UC. Also should she continue the Mucinex DM?

## 2023-02-07 ENCOUNTER — OFFICE VISIT (OUTPATIENT)
Dept: FAMILY MEDICINE CLINIC | Facility: CLINIC | Age: 68
End: 2023-02-07
Payer: MEDICARE

## 2023-02-07 VITALS
SYSTOLIC BLOOD PRESSURE: 132 MMHG | HEART RATE: 74 BPM | OXYGEN SATURATION: 95 % | RESPIRATION RATE: 18 BRPM | BODY MASS INDEX: 35 KG/M2 | WEIGHT: 201 LBS | TEMPERATURE: 99 F | DIASTOLIC BLOOD PRESSURE: 80 MMHG

## 2023-02-07 DIAGNOSIS — J01.00 SUBACUTE MAXILLARY SINUSITIS: Primary | ICD-10-CM

## 2023-02-07 PROCEDURE — 99214 OFFICE O/P EST MOD 30 MIN: CPT | Performed by: INTERNAL MEDICINE

## 2023-02-07 RX ORDER — AMOXICILLIN 500 MG/1
500 TABLET, FILM COATED ORAL 3 TIMES DAILY
Qty: 30 TABLET | Refills: 0 | Status: SHIPPED | OUTPATIENT
Start: 2023-02-07 | End: 2023-02-17

## 2023-02-15 RX ORDER — LEVOTHYROXINE SODIUM 0.07 MG/1
TABLET ORAL
Qty: 30 TABLET | Refills: 0 | Status: SHIPPED | OUTPATIENT
Start: 2023-02-15

## 2023-02-15 NOTE — TELEPHONE ENCOUNTER
LOV  2-7-23    LAST LAB 3-31-22 TSH 0.984    LAST RX 8-24-22 #90 RF 1    Next OV No future appointments.     PROTOCOL  Thyroid Supplements Protocol Passed 02/15/2023 01:23 PM   Protocol Details  TSH test in past 12 months    TSH value between 0.350 and 5.500 IU/ml    Appointment in past 12 or next 3 months

## 2023-02-25 ENCOUNTER — PATIENT MESSAGE (OUTPATIENT)
Dept: FAMILY MEDICINE CLINIC | Facility: CLINIC | Age: 68
End: 2023-02-25

## 2023-02-25 DIAGNOSIS — E03.9 ACQUIRED HYPOTHYROIDISM: Primary | ICD-10-CM

## 2023-02-25 NOTE — TELEPHONE ENCOUNTER
From: Tim Cevallos  To: Monika Genao MD  Sent: 2/25/2023 12:48 AM CST  Subject: Annual bloodwork    Dr. Payton Garay: Dr. Bay Client has ordered my bloodwork. If there are any other tests you feel I should have, please order them. One stick. ..so much simpler. Thank you. .. Yordan Cevallos

## 2023-02-26 ENCOUNTER — PATIENT MESSAGE (OUTPATIENT)
Dept: FAMILY MEDICINE CLINIC | Facility: CLINIC | Age: 68
End: 2023-02-26

## 2023-02-26 DIAGNOSIS — Z13.220 SCREENING FOR HYPERCHOLESTEROLEMIA: Primary | ICD-10-CM

## 2023-02-27 NOTE — TELEPHONE ENCOUNTER
From: Serena Kiran  To: Jose Lang MD  Sent: 2/26/2023 9:47 PM CST  Subject: Lipid profile    It's been one year since my last Lipid profile. (Both my Mom and sister have high cholesterol). Would you please order that as well? Thank you.  Jayda

## 2023-03-03 ENCOUNTER — LABORATORY ENCOUNTER (OUTPATIENT)
Dept: LAB | Age: 68
End: 2023-03-03
Attending: INTERNAL MEDICINE
Payer: MEDICARE

## 2023-03-03 DIAGNOSIS — E03.9 ACQUIRED HYPOTHYROIDISM: ICD-10-CM

## 2023-03-03 DIAGNOSIS — Z13.220 SCREENING FOR HYPERCHOLESTEROLEMIA: ICD-10-CM

## 2023-03-03 DIAGNOSIS — K51.00 ULCERATIVE PANCOLITIS WITHOUT COMPLICATION (HCC): ICD-10-CM

## 2023-03-03 LAB
ALBUMIN SERPL-MCNC: 3.9 G/DL (ref 3.4–5)
ALBUMIN/GLOB SERPL: 1.1 {RATIO} (ref 1–2)
ALP LIVER SERPL-CCNC: 84 U/L
ALT SERPL-CCNC: 21 U/L
ANION GAP SERPL CALC-SCNC: 6 MMOL/L (ref 0–18)
AST SERPL-CCNC: 19 U/L (ref 15–37)
BASOPHILS # BLD AUTO: 0.03 X10(3) UL (ref 0–0.2)
BASOPHILS NFR BLD AUTO: 0.5 %
BILIRUB SERPL-MCNC: 0.6 MG/DL (ref 0.1–2)
BUN BLD-MCNC: 15 MG/DL (ref 7–18)
CALCIUM BLD-MCNC: 9.4 MG/DL (ref 8.5–10.1)
CHLORIDE SERPL-SCNC: 109 MMOL/L (ref 98–112)
CHOLEST SERPL-MCNC: 217 MG/DL (ref ?–200)
CO2 SERPL-SCNC: 24 MMOL/L (ref 21–32)
CREAT BLD-MCNC: 0.74 MG/DL
CRP SERPL-MCNC: <0.29 MG/DL (ref ?–0.3)
EOSINOPHIL # BLD AUTO: 0.1 X10(3) UL (ref 0–0.7)
EOSINOPHIL NFR BLD AUTO: 1.5 %
ERYTHROCYTE [DISTWIDTH] IN BLOOD BY AUTOMATED COUNT: 12.2 %
FASTING PATIENT LIPID ANSWER: YES
FASTING STATUS PATIENT QL REPORTED: YES
GFR SERPLBLD BASED ON 1.73 SQ M-ARVRAT: 90 ML/MIN/1.73M2 (ref 60–?)
GLOBULIN PLAS-MCNC: 3.6 G/DL (ref 2.8–4.4)
GLUCOSE BLD-MCNC: 90 MG/DL (ref 70–99)
HCT VFR BLD AUTO: 45 %
HDLC SERPL-MCNC: 95 MG/DL (ref 40–59)
HGB BLD-MCNC: 14.5 G/DL
IMM GRANULOCYTES # BLD AUTO: 0.02 X10(3) UL (ref 0–1)
IMM GRANULOCYTES NFR BLD: 0.3 %
LDLC SERPL CALC-MCNC: 109 MG/DL (ref ?–100)
LYMPHOCYTES # BLD AUTO: 1.67 X10(3) UL (ref 1–4)
LYMPHOCYTES NFR BLD AUTO: 25.2 %
MCH RBC QN AUTO: 29.4 PG (ref 26–34)
MCHC RBC AUTO-ENTMCNC: 32.2 G/DL (ref 31–37)
MCV RBC AUTO: 91.1 FL
MONOCYTES # BLD AUTO: 0.54 X10(3) UL (ref 0.1–1)
MONOCYTES NFR BLD AUTO: 8.1 %
NEUTROPHILS # BLD AUTO: 4.27 X10 (3) UL (ref 1.5–7.7)
NEUTROPHILS # BLD AUTO: 4.27 X10(3) UL (ref 1.5–7.7)
NEUTROPHILS NFR BLD AUTO: 64.4 %
NONHDLC SERPL-MCNC: 122 MG/DL (ref ?–130)
OSMOLALITY SERPL CALC.SUM OF ELEC: 288 MOSM/KG (ref 275–295)
PLATELET # BLD AUTO: 185 10(3)UL (ref 150–450)
POTASSIUM SERPL-SCNC: 3.7 MMOL/L (ref 3.5–5.1)
PROT SERPL-MCNC: 7.5 G/DL (ref 6.4–8.2)
RBC # BLD AUTO: 4.94 X10(6)UL
SODIUM SERPL-SCNC: 139 MMOL/L (ref 136–145)
T4 FREE SERPL-MCNC: 1.2 NG/DL (ref 0.8–1.7)
TRIGL SERPL-MCNC: 73 MG/DL (ref 30–149)
TSI SER-ACNC: 1.17 MIU/ML (ref 0.36–3.74)
VLDLC SERPL CALC-MCNC: 12 MG/DL (ref 0–30)
WBC # BLD AUTO: 6.6 X10(3) UL (ref 4–11)

## 2023-03-03 PROCEDURE — 85025 COMPLETE CBC W/AUTO DIFF WBC: CPT

## 2023-03-03 PROCEDURE — 85652 RBC SED RATE AUTOMATED: CPT

## 2023-03-03 PROCEDURE — 86140 C-REACTIVE PROTEIN: CPT

## 2023-03-03 PROCEDURE — 86480 TB TEST CELL IMMUN MEASURE: CPT

## 2023-03-03 PROCEDURE — 80053 COMPREHEN METABOLIC PANEL: CPT

## 2023-03-03 PROCEDURE — 84439 ASSAY OF FREE THYROXINE: CPT

## 2023-03-03 PROCEDURE — 80061 LIPID PANEL: CPT

## 2023-03-03 PROCEDURE — 36415 COLL VENOUS BLD VENIPUNCTURE: CPT

## 2023-03-03 PROCEDURE — 84443 ASSAY THYROID STIM HORMONE: CPT

## 2023-03-04 LAB — ERYTHROCYTE [SEDIMENTATION RATE] IN BLOOD: 7 MM/HR

## 2023-03-06 ENCOUNTER — TELEPHONE (OUTPATIENT)
Dept: FAMILY MEDICINE CLINIC | Facility: CLINIC | Age: 68
End: 2023-03-06

## 2023-03-06 DIAGNOSIS — L60.9 FINGERNAIL ABNORMALITIES: ICD-10-CM

## 2023-03-06 DIAGNOSIS — Z78.0 POSTMENOPAUSAL: Primary | ICD-10-CM

## 2023-03-06 LAB
M TB IFN-G CD4+ T-CELLS BLD-ACNC: 1.42 IU/ML
M TB TUBERC IFN-G BLD QL: NEGATIVE
M TB TUBERC IGNF/MITOGEN IGNF CONTROL: >10 IU/ML
QFT TB1 AG MINUS NIL: 0.25 IU/ML
QFT TB2 AG MINUS NIL: 0.2 IU/ML

## 2023-03-06 NOTE — TELEPHONE ENCOUNTER
----- Message from Luis Cardenas sent at 3/3/2023  9:18 AM CST -----  Regarding: return call  Pt is returning call.   [phone]

## 2023-03-06 NOTE — TELEPHONE ENCOUNTER
No, Sept was booked for a  UTI and I renewed her marijuana card. She does not have any imaging or labs to complete so she does not need to make an extra trip right now.

## 2023-03-06 NOTE — TELEPHONE ENCOUNTER
Patient said that she received a call from us that she is due for a Dexa and a physical. She said she was her in September and had a physical for work (it was not coded as a physical). Copy of labs left up front for patient to . She asked if Dr Topher Tate has spoken to anyone about what the next step is for her peeling fingernails and \"cracked fingertips\".

## 2023-03-08 ENCOUNTER — HOSPITAL ENCOUNTER (OUTPATIENT)
Dept: BONE DENSITY | Age: 68
Discharge: HOME | End: 2023-03-08
Attending: INTERNAL MEDICINE
Payer: MEDICARE

## 2023-03-08 DIAGNOSIS — Z78.0 POSTMENOPAUSAL: ICD-10-CM

## 2023-03-08 PROCEDURE — 77080 DXA BONE DENSITY AXIAL: CPT | Performed by: INTERNAL MEDICINE

## 2023-10-20 ENCOUNTER — OFFICE VISIT (OUTPATIENT)
Dept: FAMILY MEDICINE CLINIC | Facility: CLINIC | Age: 68
End: 2023-10-20
Payer: MEDICARE

## 2023-10-20 ENCOUNTER — TELEPHONE (OUTPATIENT)
Dept: FAMILY MEDICINE CLINIC | Facility: CLINIC | Age: 68
End: 2023-10-20

## 2023-10-20 VITALS
TEMPERATURE: 99 F | WEIGHT: 198 LBS | SYSTOLIC BLOOD PRESSURE: 124 MMHG | BODY MASS INDEX: 35 KG/M2 | DIASTOLIC BLOOD PRESSURE: 70 MMHG | HEART RATE: 75 BPM | RESPIRATION RATE: 20 BRPM | OXYGEN SATURATION: 99 %

## 2023-10-20 DIAGNOSIS — R09.82 POST-NASAL DRIP: Primary | ICD-10-CM

## 2023-10-20 DIAGNOSIS — R05.8 UPPER AIRWAY COUGH SYNDROME: ICD-10-CM

## 2023-10-20 PROCEDURE — 3074F SYST BP LT 130 MM HG: CPT

## 2023-10-20 PROCEDURE — 99212 OFFICE O/P EST SF 10 MIN: CPT

## 2023-10-20 PROCEDURE — 3078F DIAST BP <80 MM HG: CPT

## 2023-10-20 NOTE — PATIENT INSTRUCTIONS
-Flonase nasal spray 2 sprays each nostril daily    -Delsym cough syrup    -Also the antihistamine decongestant combinations are great for the post nasal drip and upper airway cough syndrome

## 2023-10-20 NOTE — TELEPHONE ENCOUNTER
Pt has cold sx. It is keeping her up at night. She is taking mucinex dm for about a week. Offered appt, but said she didn't need one.

## 2023-10-20 NOTE — TELEPHONE ENCOUNTER
Phone call from patient. Has had a cough for a week. Coughing up green for one week. Using Mucinex DM - helps somewhat. No fever. Complaining of night time cough. Appointment scheduled.

## 2023-10-23 ENCOUNTER — TELEPHONE (OUTPATIENT)
Dept: FAMILY MEDICINE CLINIC | Facility: CLINIC | Age: 68
End: 2023-10-23

## 2023-10-23 RX ORDER — MONTELUKAST SODIUM 10 MG/1
10 TABLET ORAL DAILY
Qty: 30 TABLET | Refills: 0 | Status: SHIPPED | OUTPATIENT
Start: 2023-10-23 | End: 2023-11-22

## 2023-10-23 NOTE — TELEPHONE ENCOUNTER
Patient said that she saw Edmundo France a few days ago for symptoms she has had for several weeks. She has been coughing at night but it is manageable during the day. She is taking Clartin D and Flonase. She said it is not in her lungs it is from post nasal drainage. She does not want to take antibiotics unless necessary because it \"messes up her GI system.

## 2023-10-24 ENCOUNTER — TELEPHONE (OUTPATIENT)
Dept: FAMILY MEDICINE CLINIC | Facility: CLINIC | Age: 68
End: 2023-10-24

## 2023-10-24 NOTE — TELEPHONE ENCOUNTER
PT STATES SHE IS NOT FEELING BETTER. COUGHS EVERY FOUR MINUTES. JUST STARTED SINGULAR LAST NIGHT. ASKING IF AN ABX CAN BE CALLED IN? [facility].

## 2023-10-24 NOTE — TELEPHONE ENCOUNTER
Seen by Alex Navarrete on 10/20/23 for URI  Had called yesterday - started on Singular.   States \"coughed all night\" Delsym not effective  Now, would like an antibiotic    Please advise

## 2023-10-24 NOTE — TELEPHONE ENCOUNTER
Spoke with patient - informed that she needs to be seen.   Future Appointments   Date Time Provider Fany Cavazos   10/25/2023  9:30 AM REMIGIO Moon [facility] [place]

## 2023-10-25 ENCOUNTER — OFFICE VISIT (OUTPATIENT)
Dept: FAMILY MEDICINE CLINIC | Facility: CLINIC | Age: 68
End: 2023-10-25

## 2023-10-25 VITALS
DIASTOLIC BLOOD PRESSURE: 70 MMHG | BODY MASS INDEX: 35.08 KG/M2 | OXYGEN SATURATION: 96 % | WEIGHT: 198 LBS | SYSTOLIC BLOOD PRESSURE: 128 MMHG | HEIGHT: 63 IN | RESPIRATION RATE: 12 BRPM | TEMPERATURE: 97 F | HEART RATE: 77 BPM

## 2023-10-25 DIAGNOSIS — J40 BRONCHITIS: ICD-10-CM

## 2023-10-25 DIAGNOSIS — J01.00 SUBACUTE MAXILLARY SINUSITIS: Primary | ICD-10-CM

## 2023-10-25 PROCEDURE — 99213 OFFICE O/P EST LOW 20 MIN: CPT

## 2023-10-25 RX ORDER — AMOXICILLIN AND CLAVULANATE POTASSIUM 875; 125 MG/1; MG/1
1 TABLET, FILM COATED ORAL 2 TIMES DAILY
Qty: 20 TABLET | Refills: 0 | Status: SHIPPED | OUTPATIENT
Start: 2023-10-25 | End: 2023-11-04

## 2023-10-25 RX ORDER — PREDNISONE 20 MG/1
TABLET ORAL
Qty: 9 TABLET | Refills: 0 | Status: SHIPPED | OUTPATIENT
Start: 2023-10-25 | End: 2023-10-31

## 2023-10-29 ENCOUNTER — MOBILE ENCOUNTER (OUTPATIENT)
Dept: FAMILY MEDICINE CLINIC | Facility: CLINIC | Age: 68
End: 2023-10-29

## 2023-10-29 RX ORDER — ALBUTEROL SULFATE 90 UG/1
2 AEROSOL, METERED RESPIRATORY (INHALATION) EVERY 6 HOURS PRN
Qty: 1 EACH | Refills: 1 | Status: SHIPPED | OUTPATIENT
Start: 2023-10-29

## 2023-10-31 ENCOUNTER — TELEPHONE (OUTPATIENT)
Dept: FAMILY MEDICINE CLINIC | Facility: CLINIC | Age: 68
End: 2023-10-31

## 2023-10-31 DIAGNOSIS — R05.2 SUBACUTE COUGH: Primary | ICD-10-CM

## 2023-10-31 RX ORDER — LEVOFLOXACIN 500 MG/1
500 TABLET, FILM COATED ORAL DAILY
Qty: 10 TABLET | Refills: 0 | Status: SHIPPED | OUTPATIENT
Start: 2023-10-31 | End: 2023-11-10

## 2023-10-31 NOTE — TELEPHONE ENCOUNTER
Patient advised. She asked if she should still stay on all the other medications. Advised yes.  COCO Bates RN

## 2023-10-31 NOTE — TELEPHONE ENCOUNTER
Patient said that she feels like \"shit\". She is still coughing up mucous. She finished the steroid yesterday. She started Albuterol Sunday that Dr Lisa Fonseca called in, she is taking 2 puffs every 6 hours. She is also on Amoxicillin, she's taking Vitamin D, Flonase nasal spray, Singulair, Mucinex DM. She said she feels like it is going into her chest.  She has been sick for 3 weeks. Should she have a chest xray?

## 2023-11-01 ENCOUNTER — HOSPITAL ENCOUNTER (OUTPATIENT)
Dept: GENERAL RADIOLOGY | Age: 68
Discharge: HOME | End: 2023-11-01
Attending: INTERNAL MEDICINE
Payer: MEDICARE

## 2023-11-01 DIAGNOSIS — R05.2 SUBACUTE COUGH: ICD-10-CM

## 2023-11-01 PROCEDURE — 71046 X-RAY EXAM CHEST 2 VIEWS: CPT | Performed by: INTERNAL MEDICINE

## 2023-11-02 ENCOUNTER — TELEPHONE (OUTPATIENT)
Dept: FAMILY MEDICINE CLINIC | Facility: CLINIC | Age: 68
End: 2023-11-02

## 2023-11-02 RX ORDER — ACETAMINOPHEN AND CODEINE PHOSPHATE 300; 30 MG/1; MG/1
1 TABLET ORAL EVERY 4 HOURS PRN
Qty: 42 TABLET | Refills: 0 | Status: SHIPPED | OUTPATIENT
Start: 2023-11-02 | End: 2023-11-09

## 2023-11-02 NOTE — TELEPHONE ENCOUNTER
DELSYM NOT HELPING WITH HER COUGH, CAN SOMETHING BE SENT TO [facility]?  ALSO WANTS TO TALK WITH NURSE ABOUT HER XRAY RESULTS & IF ANYTHING ELSE NEEDS DONE, CALL PT BACK

## 2023-11-02 NOTE — TELEPHONE ENCOUNTER
Patient said that the cough is very intense. She said she coughs so hard she almost vomits. She finished Prednisone taper a couple days ago. She said she is not SOB but has no energy. She denies fever. She is taking 2 puffs of the Ventolin every 6 hours. She said the Delsym is \"not doing anything\". She is also taking Mucinex DM and taking Levaquin and Flonase. She is asking for Codeine.

## 2023-11-03 ENCOUNTER — TELEPHONE (OUTPATIENT)
Dept: FAMILY MEDICINE CLINIC | Facility: CLINIC | Age: 68
End: 2023-11-03

## 2023-11-03 RX ORDER — PREDNISONE 20 MG/1
TABLET ORAL
Qty: 13 TABLET | Refills: 0 | Status: SHIPPED | OUTPATIENT
Start: 2023-11-03 | End: 2023-11-11

## 2023-11-03 NOTE — TELEPHONE ENCOUNTER
Spoke with patient who has not been feeling well for a while. She was seen on 10/25/23 by Vira Szymanski and prescribed Augmentin and Prednisone for sinusitis and bronchitis. She called back on 10/29/23 and was prescribed an albuterol inhaler by Dr. Amanda Oleary. She called back on 10/31/23 with no improvement. Her ANB was changed to Levaquin and a chest xray was ordered. The Xray came back with no infiltrates. She called back yesterday asking for a something with codeine to help her cough and help her sleep. Dr. Osiel Mercado sent in Tylenol with Codeine. She states she is not in pain and it didn't help fully. She states she felt the tylenol helped her fall asleep, but then after she got up to use the restroom, she was right back to coughing and no sleep. She has been using Delsym, which she states \"is basically Koolaid\" and not helpful. She has tried a humidifier and a diffuser without success. She states states she is surprised Dr. Osiel Mercado sent in the Tylenol. She is asking if someone here can send in a cough syrup with Codeine instead. Please advise.

## 2023-11-03 NOTE — TELEPHONE ENCOUNTER
Tylenol with codeine plus Delsym or Mucinex is equivalent to codeine-based cough syrup. The codeine is a cough suppressant  Levaquin is a very strong antibiotic. She should continue the albuterol 2 puffs up to every 4 hours, would repeat prednisone 20 mg twice daily x5 days followed by 20 mg daily x3 days.   If no significant improvement, she should be followed up in the office

## 2023-11-03 NOTE — TELEPHONE ENCOUNTER
SHE SAID THE MEDS ARE NOT WORKING AND SHE IS ASKING FOR SOMETHING ELSE TO BE CALLED IN FOR THE COUGH. I DID TELL HER DR. BERKOWITZ IS OUT OF THE OFFICE UNTIL TOMORROW BUT SHE WANTS SOMEONE IN THE OFFICE TO CALL SOMETHING IN.

## 2023-11-06 ENCOUNTER — OFFICE VISIT (OUTPATIENT)
Dept: FAMILY MEDICINE CLINIC | Facility: CLINIC | Age: 68
End: 2023-11-06
Payer: MEDICARE

## 2023-11-06 VITALS
WEIGHT: 198.38 LBS | HEART RATE: 93 BPM | TEMPERATURE: 99 F | BODY MASS INDEX: 35 KG/M2 | DIASTOLIC BLOOD PRESSURE: 76 MMHG | OXYGEN SATURATION: 97 % | RESPIRATION RATE: 18 BRPM | SYSTOLIC BLOOD PRESSURE: 134 MMHG

## 2023-11-06 DIAGNOSIS — J20.9 BRONCHITIS WITH BRONCHOSPASM: Primary | ICD-10-CM

## 2023-11-06 PROCEDURE — 99214 OFFICE O/P EST MOD 30 MIN: CPT | Performed by: INTERNAL MEDICINE

## 2023-11-17 ENCOUNTER — TELEPHONE (OUTPATIENT)
Dept: FAMILY MEDICINE CLINIC | Facility: CLINIC | Age: 68
End: 2023-11-17

## 2023-11-17 DIAGNOSIS — J20.9 BRONCHITIS WITH BRONCHOSPASM: Primary | ICD-10-CM

## 2023-11-17 RX ORDER — BENZONATATE 200 MG/1
200 CAPSULE ORAL 3 TIMES DAILY PRN
Qty: 21 CAPSULE | Refills: 0 | Status: SHIPPED | OUTPATIENT
Start: 2023-11-17 | End: 2023-11-24

## 2023-11-17 NOTE — TELEPHONE ENCOUNTER
She can continue with the breo inhaler daily and I will send tessalon pearls to the pharmacy for her.

## 2023-11-17 NOTE — TELEPHONE ENCOUNTER
Patient would like something for her cough, she is currently taking all meds ordered and the cough will not stop. The cough is productive yellow/green, patient states she has been in 3 x in the last 6 weeks.

## 2023-12-18 ENCOUNTER — TELEPHONE (OUTPATIENT)
Dept: FAMILY MEDICINE CLINIC | Facility: CLINIC | Age: 68
End: 2023-12-18

## 2023-12-18 NOTE — TELEPHONE ENCOUNTER
PT CALLING TO INFORM SHE IS NOT ABLE TO TAKE THE DAY OFF as THEY SHE HAS NO ONE THAT CAN COVER FOR HER. WILL GO TO [facility] AFTER WORK.

## 2023-12-18 NOTE — TELEPHONE ENCOUNTER
Don't take antibiotics , there is both COVID and FLU going around I highly suggest you check for these due to work and upcoming holidays so you know what your infected with.

## 2023-12-18 NOTE — TELEPHONE ENCOUNTER
Patient said that she started feeling sick Friday with post nasal drainage and laryngitis, she does not feel bad yet but does not want it to turn into bronchitis again. She said she that she started taking Zicam the leftover antibiotic she had from last month, she only has a three day supply.

## 2024-02-02 ENCOUNTER — TELEPHONE (OUTPATIENT)
Dept: FAMILY MEDICINE CLINIC | Facility: CLINIC | Age: 69
End: 2024-02-02

## 2024-02-05 ENCOUNTER — PATIENT MESSAGE (OUTPATIENT)
Dept: FAMILY MEDICINE CLINIC | Facility: CLINIC | Age: 69
End: 2024-02-05

## 2024-02-05 DIAGNOSIS — E78.00 HYPERCHOLESTEREMIA: ICD-10-CM

## 2024-02-05 DIAGNOSIS — E03.9 ACQUIRED HYPOTHYROIDISM: Primary | ICD-10-CM

## 2024-02-05 NOTE — TELEPHONE ENCOUNTER
From: Jayda Mays  To: Dnona Verdugo  Sent: 2/5/2024 10:15 AM CST  Subject: Labwork    Hello! I have annual labwork due from Dr Uribe before March 1st. May we please do that on my sched. appt. with you Feb.27th? If so please add my TSH, Lipid Profile, anything else you may want in there. Thank you

## 2024-02-26 ENCOUNTER — OFFICE VISIT (OUTPATIENT)
Dept: FAMILY MEDICINE CLINIC | Facility: CLINIC | Age: 69
End: 2024-02-26
Payer: MEDICARE

## 2024-02-26 ENCOUNTER — TELEPHONE (OUTPATIENT)
Dept: FAMILY MEDICINE CLINIC | Facility: CLINIC | Age: 69
End: 2024-02-26

## 2024-02-26 VITALS
DIASTOLIC BLOOD PRESSURE: 72 MMHG | HEIGHT: 63 IN | RESPIRATION RATE: 18 BRPM | WEIGHT: 200 LBS | BODY MASS INDEX: 35.44 KG/M2 | TEMPERATURE: 100 F | SYSTOLIC BLOOD PRESSURE: 126 MMHG | OXYGEN SATURATION: 98 % | HEART RATE: 68 BPM

## 2024-02-26 DIAGNOSIS — R07.89 OTHER CHEST PAIN: Primary | ICD-10-CM

## 2024-02-26 DIAGNOSIS — R05.2 SUBACUTE COUGH: ICD-10-CM

## 2024-02-26 PROCEDURE — 99214 OFFICE O/P EST MOD 30 MIN: CPT | Performed by: INTERNAL MEDICINE

## 2024-02-26 PROCEDURE — 93000 ELECTROCARDIOGRAM COMPLETE: CPT | Performed by: INTERNAL MEDICINE

## 2024-02-26 PROCEDURE — 87637 SARSCOV2&INF A&B&RSV AMP PRB: CPT | Performed by: INTERNAL MEDICINE

## 2024-02-26 RX ORDER — ESTRADIOL 0.1 MG/G
1 CREAM VAGINAL AS DIRECTED
COMMUNITY
Start: 2024-01-31

## 2024-02-26 RX ORDER — BENZONATATE 200 MG/1
200 CAPSULE ORAL 3 TIMES DAILY PRN
Qty: 30 CAPSULE | Refills: 0 | Status: SHIPPED | OUTPATIENT
Start: 2024-02-26 | End: 2024-03-07

## 2024-02-26 NOTE — TELEPHONE ENCOUNTER
Patient has a wellness appointment with Dr Verdugo tomorrow. She has not been feeling well since last Wed. Her Grandson had strep. She said she had not symptoms of Strep. Appointment scheduled with Dr Verdugo today at 230pm.

## 2024-02-26 NOTE — PROGRESS NOTES
Jayda Mays is a 68 year old female.  HPI:   Cough  Associated symptoms include postnasal drip and wheezing.     Yi Mays is a 68 year old female who presents for no appetite, dry mouth, and cough since Wednesday. She reports a 10 pound weight loss. She reports feeling like she got \"hit by a train.\" She reports that her cough is productive and sticky. She describes it as a raspy cough that rattles. It is more prounounced in the morning and keeps her awake at night. She has been taking benzonatate pearls before bed for cough that has provided relief. Has been using her albuterol inhaler twice a day. She found a leftover prescription of amoxicillin-clavulanate. She reports that she started taking this yesterday. She had two doses yesterday and one today. She took a home COVID test that was negative. She has spent time with her grandson and family who now have strep.  Also works in a  with illness going around. Denies fever, nausea, vomiting, dizziness, sinus pain/ pressure.    She also complains of chest pain/ pressure that she describes as squeezing. She states that it has happened twice (months apart) now while in sitting in [employer]. She rates the pain as 6/10. The episode lasts 30 minutes. The only thing that makes the pain is better time. Denies palpitation, sweating, dizziness, heartburn, or radiation during episodes. She states that her father had a sudden heart attack at 53, her oldest brother had triple bypass, and youngest brother has heart problems but unsure exact problem.       Current Outpatient Medications   Medication Sig Dispense Refill    estradiol 0.1 MG/GM Vaginal Cream Place 1 g vaginally As Directed.      NON FORMULARY       NON FORMULARY       albuterol 108 (90 Base) MCG/ACT Inhalation Aero Soln Inhale 2 puffs into the lungs every 6 (six) hours as needed for Wheezing. 1 each 1    ascorbic acid (C-1000) 1000 MG Oral Tab       levothyroxine 75 MCG Oral Tab Take 1 tablet (75 mcg total) by  mouth before breakfast. 90 tablet 3    calcipotriene 0.005 % External Cream       Vedolizumab (ENTYVIO IV) Inject into the vein.      Lactobacillus (PROBIOTIC ACIDOPHILUS) Oral Cap Take 30,000,000 Units by mouth daily.      Glucosamine-Chondroit-Vit C-Mn (GLUCOSAMINE CHONDR 1500 COMPLX) Oral Cap Take  by mouth.      L-Lysine 1000 MG Oral Tab Take 1,000 mg by mouth daily.        CALCIUM CITRATE OR by Does not apply route.      Multiple Vitamin (MULTIVITAMIN OR) Take  by mouth.        Past Medical History:   Diagnosis Date    Belching     Blood in the stool 1 wk ago    Change in hair nails extremly weak, brittle    Diarrhea, unspecified 2 wks ago    Eczema     Fatigue 2 wks ago    Food intolerance     Hypothyroidism     Irregular bowel habits     Loss of appetite 2 wks ago    Menopausal state     Obesity, unspecified     Osteoarthritis     Pain in joints 4 yrs ago    upon kneeling    Pain with bowel movements     Rash 1964    chronic eczema    Stool incontinence 2 wks ago    Stress I do not remember    Ulcerative colitis (HCC)     Ulcerative colitis (HCC)     Vitamin D deficiency     Wears glasses     reading glasses    Weight loss 2 wks ago      Social History:  Social History     Socioeconomic History    Marital status:    Tobacco Use    Smoking status: Former     Packs/day: 1.00     Years: 10.00     Additional pack years: 0.00     Total pack years: 10.00     Types: Cigarettes     Quit date: 1979     Years since quittin.8    Smokeless tobacco: Never   Substance and Sexual Activity    Alcohol use: Yes     Alcohol/week: 2.5 standard drinks of alcohol     Types: 3 Glasses of wine per week     Comment: 3 glasses of wine  per week    Drug use: No          REVIEW OF SYSTEMS:     Review of Systems   Constitutional:  Positive for activity change.   HENT:  Positive for postnasal drip.    Eyes: Negative.    Respiratory:  Positive for cough and wheezing.    Cardiovascular:         Chest pain intermittent,  reports 2 episodes of chest pain    Gastrointestinal: Negative.    Endocrine: Negative.    Genitourinary: Negative.    Musculoskeletal: Negative.    Skin: Negative.    Neurological: Negative.    Psychiatric/Behavioral: Negative.         EXAM:   /72   Pulse 68   Temp 99.7 °F (37.6 °C) (Temporal)   Resp 18   Ht 5' 3\" (1.6 m)   Wt 200 lb (90.7 kg)   LMP 10/01/2012   SpO2 98%   BMI 35.43 kg/m²     Physical Exam  Vitals reviewed.   Constitutional:       Appearance: Normal appearance.   HENT:      Head: Normocephalic and atraumatic.      Right Ear: Tympanic membrane normal.      Left Ear: Tympanic membrane normal.      Mouth/Throat:      Mouth: Mucous membranes are moist.   Cardiovascular:      Rate and Rhythm: Normal rate and regular rhythm.      Pulses: Normal pulses.      Heart sounds: Normal heart sounds.   Pulmonary:      Effort: Pulmonary effort is normal.      Breath sounds: Normal breath sounds.   Musculoskeletal:         General: Normal range of motion.      Cervical back: Normal range of motion.   Skin:     General: Skin is warm and dry.   Neurological:      General: No focal deficit present.      Mental Status: She is alert and oriented to person, place, and time.   Psychiatric:         Mood and Affect: Mood normal.         Behavior: Behavior normal.         Thought Content: Thought content normal.          ASSESSMENT AND PLAN:   Yi Mays is a 68 year old female who presents for no appetite, dry mouth, and cough since Wednesday. Patient educated to stop taking the amoxicillin- clavulanate. Prescription sent for benzonatate 200 mg capsules three times a day as needed for cough. Patient swabbed for RSV/ Flu/ COVID. EKG done which showed sinus bradycardia. Nuclear stress test ordered.   The patient indicates understanding of these issues and agrees to the plan.  The patient is asked to return if symptoms worsen.     Katelynn Bracht  2/26/2024

## 2024-02-26 NOTE — TELEPHONE ENCOUNTER
NO APPETITE, DRY MOUTH, WEAK,DIZZY,COUGH,COVID NEGATIVE, HAS MISSED WORK SINCE LAST WEDS, WANTS TO TALK TO NURSE

## 2024-02-27 LAB
ATRIAL RATE: 57 BPM
P AXIS: 42 DEGREES
P-R INTERVAL: 130 MS
Q-T INTERVAL: 432 MS
QRS DURATION: 92 MS
QTC CALCULATION (BEZET): 420 MS
R AXIS: 9 DEGREES
T AXIS: 33 DEGREES
VENTRICULAR RATE: 57 BPM

## 2024-02-28 ENCOUNTER — TELEPHONE (OUTPATIENT)
Dept: FAMILY MEDICINE CLINIC | Facility: CLINIC | Age: 69
End: 2024-02-28

## 2024-02-28 DIAGNOSIS — E55.9 VITAMIN D DEFICIENCY: Primary | ICD-10-CM

## 2024-02-28 LAB
FLUAV + FLUBV RNA SPEC NAA+PROBE: DETECTED
FLUAV + FLUBV RNA SPEC NAA+PROBE: NOT DETECTED
RSV RNA SPEC NAA+PROBE: NOT DETECTED
SARS-COV-2 RNA RESP QL NAA+PROBE: NOT DETECTED

## 2024-02-28 NOTE — TELEPHONE ENCOUNTER
Patient wanted to make lab appointment for labs ordered from Dr. Uribe, as well as asked if Dr. Verdugo would check her Vit D.

## 2024-02-29 ENCOUNTER — TELEPHONE (OUTPATIENT)
Dept: FAMILY MEDICINE CLINIC | Facility: CLINIC | Age: 69
End: 2024-02-29

## 2024-02-29 ENCOUNTER — PATIENT MESSAGE (OUTPATIENT)
Dept: FAMILY MEDICINE CLINIC | Facility: CLINIC | Age: 69
End: 2024-02-29

## 2024-02-29 RX ORDER — METHYLPREDNISOLONE 4 MG/1
TABLET ORAL
Qty: 1 EACH | Refills: 0 | Status: SHIPPED | OUTPATIENT
Start: 2024-02-29

## 2024-02-29 NOTE — TELEPHONE ENCOUNTER
Patient said she feels \"lousy\". She said she is not having chest pain but she feels like she has to to take a deep breath to get air and her chest feels heavy. She has a productive cough. She said she gets very fatigued with any activity. She is drinking fluids but does not want to eat. She has a \"terrible taste in her mouth\".  She is taking Azo and Prelief that Dr Dominique prescribed. She is taking Benzonatate at night and Ventolin BID.  She asked if there is anything she can be prescribed \"something is not right\", she has been sick for 9 days.

## 2024-03-12 ENCOUNTER — PATIENT MESSAGE (OUTPATIENT)
Dept: FAMILY MEDICINE CLINIC | Facility: CLINIC | Age: 69
End: 2024-03-12

## 2024-03-12 DIAGNOSIS — I20.89 STABLE ANGINA PECTORIS: Primary | ICD-10-CM

## 2024-03-12 NOTE — TELEPHONE ENCOUNTER
From: Jayda Mays  To: Donna Verdugo  Sent: 3/12/2024 1:22 PM CDT  Subject: Nuclear Med. Babs Stress Test    Hi. I have a question for you about this upcoming test: Do I have the OPTION of either the Treadmill OR injection of meds to increase my heart rate? If so, I would prefer using the Treadmill. I would not have any problem getting my heart rate up. Thank you

## 2024-03-18 ENCOUNTER — LABORATORY ENCOUNTER (OUTPATIENT)
Dept: LAB | Age: 69
End: 2024-03-18
Attending: INTERNAL MEDICINE
Payer: MEDICARE

## 2024-03-18 ENCOUNTER — PATIENT MESSAGE (OUTPATIENT)
Dept: FAMILY MEDICINE CLINIC | Facility: CLINIC | Age: 69
End: 2024-03-18

## 2024-03-18 DIAGNOSIS — K51.50 ULCERATIVE COLITIS, LEFT SIDED, WITHOUT COMPLICATIONS (HCC): ICD-10-CM

## 2024-03-18 DIAGNOSIS — E03.9 ACQUIRED HYPOTHYROIDISM: ICD-10-CM

## 2024-03-18 DIAGNOSIS — E55.9 VITAMIN D DEFICIENCY: ICD-10-CM

## 2024-03-18 DIAGNOSIS — E78.00 HYPERCHOLESTEREMIA: ICD-10-CM

## 2024-03-18 LAB
ALBUMIN SERPL-MCNC: 3.8 G/DL (ref 3.4–5)
ALBUMIN/GLOB SERPL: 1.1 {RATIO} (ref 1–2)
ALP LIVER SERPL-CCNC: 68 U/L
ALT SERPL-CCNC: 20 U/L
ANION GAP SERPL CALC-SCNC: 2 MMOL/L (ref 0–18)
AST SERPL-CCNC: 10 U/L (ref 15–37)
BASOPHILS # BLD AUTO: 0.03 X10(3) UL (ref 0–0.2)
BASOPHILS NFR BLD AUTO: 0.5 %
BILIRUB SERPL-MCNC: 0.7 MG/DL (ref 0.1–2)
BUN BLD-MCNC: 11 MG/DL (ref 9–23)
CALCIUM BLD-MCNC: 9.7 MG/DL (ref 8.5–10.1)
CHLORIDE SERPL-SCNC: 108 MMOL/L (ref 98–112)
CHOLEST SERPL-MCNC: 212 MG/DL (ref ?–200)
CO2 SERPL-SCNC: 27 MMOL/L (ref 21–32)
CREAT BLD-MCNC: 0.69 MG/DL
CRP SERPL-MCNC: <0.29 MG/DL (ref ?–0.3)
EGFRCR SERPLBLD CKD-EPI 2021: 96 ML/MIN/1.73M2 (ref 60–?)
EOSINOPHIL # BLD AUTO: 0.09 X10(3) UL (ref 0–0.7)
EOSINOPHIL NFR BLD AUTO: 1.4 %
ERYTHROCYTE [DISTWIDTH] IN BLOOD BY AUTOMATED COUNT: 12.3 %
FASTING PATIENT LIPID ANSWER: YES
FASTING STATUS PATIENT QL REPORTED: YES
GLOBULIN PLAS-MCNC: 3.4 G/DL (ref 2.8–4.4)
GLUCOSE BLD-MCNC: 93 MG/DL (ref 70–99)
HCT VFR BLD AUTO: 41.4 %
HDLC SERPL-MCNC: 82 MG/DL (ref 40–59)
HGB BLD-MCNC: 14 G/DL
IMM GRANULOCYTES # BLD AUTO: 0.02 X10(3) UL (ref 0–1)
IMM GRANULOCYTES NFR BLD: 0.3 %
LDLC SERPL CALC-MCNC: 110 MG/DL (ref ?–100)
LYMPHOCYTES # BLD AUTO: 1.44 X10(3) UL (ref 1–4)
LYMPHOCYTES NFR BLD AUTO: 22.9 %
MCH RBC QN AUTO: 29.5 PG (ref 26–34)
MCHC RBC AUTO-ENTMCNC: 33.8 G/DL (ref 31–37)
MCV RBC AUTO: 87.3 FL
MONOCYTES # BLD AUTO: 0.58 X10(3) UL (ref 0.1–1)
MONOCYTES NFR BLD AUTO: 9.2 %
NEUTROPHILS # BLD AUTO: 4.13 X10 (3) UL (ref 1.5–7.7)
NEUTROPHILS # BLD AUTO: 4.13 X10(3) UL (ref 1.5–7.7)
NEUTROPHILS NFR BLD AUTO: 65.7 %
NONHDLC SERPL-MCNC: 130 MG/DL (ref ?–130)
OSMOLALITY SERPL CALC.SUM OF ELEC: 283 MOSM/KG (ref 275–295)
PLATELET # BLD AUTO: 217 10(3)UL (ref 150–450)
POTASSIUM SERPL-SCNC: 3.9 MMOL/L (ref 3.5–5.1)
PROT SERPL-MCNC: 7.2 G/DL (ref 6.4–8.2)
RBC # BLD AUTO: 4.74 X10(6)UL
SODIUM SERPL-SCNC: 137 MMOL/L (ref 136–145)
T4 FREE SERPL-MCNC: 1.1 NG/DL (ref 0.8–1.7)
TRIGL SERPL-MCNC: 113 MG/DL (ref 30–149)
TSI SER-ACNC: 0.89 MIU/ML (ref 0.36–3.74)
VIT D+METAB SERPL-MCNC: 119.6 NG/ML (ref 30–100)
VLDLC SERPL CALC-MCNC: 19 MG/DL (ref 0–30)
WBC # BLD AUTO: 6.3 X10(3) UL (ref 4–11)

## 2024-03-18 PROCEDURE — 85025 COMPLETE CBC W/AUTO DIFF WBC: CPT

## 2024-03-18 PROCEDURE — 86480 TB TEST CELL IMMUN MEASURE: CPT

## 2024-03-18 PROCEDURE — 80061 LIPID PANEL: CPT

## 2024-03-18 PROCEDURE — 84439 ASSAY OF FREE THYROXINE: CPT

## 2024-03-18 PROCEDURE — 36415 COLL VENOUS BLD VENIPUNCTURE: CPT

## 2024-03-18 PROCEDURE — 82306 VITAMIN D 25 HYDROXY: CPT

## 2024-03-18 PROCEDURE — 86140 C-REACTIVE PROTEIN: CPT

## 2024-03-18 PROCEDURE — 80053 COMPREHEN METABOLIC PANEL: CPT

## 2024-03-18 PROCEDURE — 84443 ASSAY THYROID STIM HORMONE: CPT

## 2024-03-18 NOTE — TELEPHONE ENCOUNTER
From: Jayda Mays  To: Donna Verdugo  Sent: 3/18/2024 4:43 PM CDT  Subject: Vitamin D test result    Hi. Yes, I saw that it was high. Will STOP the 2400 IU micellized drops I typically use daily. When do we need to re check it? Thank you!

## 2024-03-20 ENCOUNTER — HOSPITAL ENCOUNTER (OUTPATIENT)
Dept: CV DIAGNOSTICS | Age: 69
Discharge: HOME | End: 2024-03-20
Attending: INTERNAL MEDICINE
Payer: MEDICARE

## 2024-03-20 DIAGNOSIS — I20.89 STABLE ANGINA PECTORIS (HCC): ICD-10-CM

## 2024-03-20 LAB
M TB IFN-G CD4+ T-CELLS BLD-ACNC: 0.36 IU/ML
M TB TUBERC IFN-G BLD QL: NEGATIVE
M TB TUBERC IGNF/MITOGEN IGNF CONTROL: >10 IU/ML
QFT TB1 AG MINUS NIL: 0.29 IU/ML
QFT TB2 AG MINUS NIL: 0.17 IU/ML

## 2024-03-20 PROCEDURE — 78452 HT MUSCLE IMAGE SPECT MULT: CPT | Performed by: INTERNAL MEDICINE

## 2024-03-20 PROCEDURE — 93018 CV STRESS TEST I&R ONLY: CPT | Performed by: INTERNAL MEDICINE

## 2024-03-20 PROCEDURE — 93017 CV STRESS TEST TRACING ONLY: CPT | Performed by: INTERNAL MEDICINE

## 2024-03-25 ENCOUNTER — PATIENT MESSAGE (OUTPATIENT)
Dept: FAMILY MEDICINE CLINIC | Facility: CLINIC | Age: 69
End: 2024-03-25

## 2024-03-25 NOTE — TELEPHONE ENCOUNTER
From: Jayda Mays  To: Donna Verdugo  Sent: 3/25/2024 9:02 AM CDT  Subject: Stress Test result    That's good news Dr. Verdugo considering they were expecting Stable Angina Pectoris..... I have my [employer] screenings mid April. I assume we will discuss the chest pressure thing further at my Medicare check appt. End of April? I have not had another episode. Thank you

## 2024-04-29 ENCOUNTER — OFFICE VISIT (OUTPATIENT)
Dept: FAMILY MEDICINE CLINIC | Facility: CLINIC | Age: 69
End: 2024-04-29
Payer: MEDICARE

## 2024-04-29 VITALS
BODY MASS INDEX: 35.44 KG/M2 | HEART RATE: 78 BPM | WEIGHT: 200 LBS | OXYGEN SATURATION: 97 % | DIASTOLIC BLOOD PRESSURE: 76 MMHG | HEIGHT: 63 IN | RESPIRATION RATE: 18 BRPM | TEMPERATURE: 99 F | SYSTOLIC BLOOD PRESSURE: 134 MMHG

## 2024-04-29 DIAGNOSIS — Z13.39 SCREENING FOR ALCOHOL PROBLEM: ICD-10-CM

## 2024-04-29 DIAGNOSIS — E78.00 HYPERCHOLESTEREMIA: ICD-10-CM

## 2024-04-29 DIAGNOSIS — Z01.419 ENCOUNTER FOR GYNECOLOGICAL EXAMINATION WITHOUT ABNORMAL FINDING: ICD-10-CM

## 2024-04-29 DIAGNOSIS — Z13.220 SCREENING FOR HYPERCHOLESTEROLEMIA: ICD-10-CM

## 2024-04-29 DIAGNOSIS — E55.9 VITAMIN D DEFICIENCY: Primary | ICD-10-CM

## 2024-04-29 DIAGNOSIS — N39.46 MIXED STRESS AND URGE URINARY INCONTINENCE: ICD-10-CM

## 2024-04-29 DIAGNOSIS — Z12.31 ENCOUNTER FOR SCREENING MAMMOGRAM FOR MALIGNANT NEOPLASM OF BREAST: ICD-10-CM

## 2024-04-29 DIAGNOSIS — Z00.00 ENCOUNTER FOR ANNUAL HEALTH EXAMINATION: ICD-10-CM

## 2024-04-29 DIAGNOSIS — E03.9 ACQUIRED HYPOTHYROIDISM: ICD-10-CM

## 2024-04-29 DIAGNOSIS — Z78.0 POSTMENOPAUSAL: ICD-10-CM

## 2024-04-29 PROCEDURE — 87624 HPV HI-RISK TYP POOLED RSLT: CPT | Performed by: INTERNAL MEDICINE

## 2024-04-29 PROCEDURE — 88175 CYTOPATH C/V AUTO FLUID REDO: CPT | Performed by: INTERNAL MEDICINE

## 2024-04-29 RX ORDER — AMITRIPTYLINE HYDROCHLORIDE 25 MG/1
25 TABLET, FILM COATED ORAL NIGHTLY
COMMUNITY

## 2024-04-29 NOTE — PATIENT INSTRUCTIONS
Jayda Mays's SCREENING SCHEDULE   Tests on this list are recommended by your physician but may not be covered, or covered at this frequency, by your insurer.   Please check with your insurance carrier before scheduling to verify coverage.   PREVENTATIVE SERVICES FREQUENCY &  COVERAGE DETAILS LAST COMPLETION DATE   Diabetes Screening    Fasting Blood Sugar /  Glucose    One screening every 12 months if never tested or if previously tested but not diagnosed with pre-diabetes   One screening every 6 months if diagnosed with pre-diabetes Lab Results   Component Value Date    GLU 93 03/18/2024        Cardiovascular Disease Screening    Lipid Panel  Cholesterol  Lipoprotein (HDL)  Triglycerides Covered every 5 years for all Medicare beneficiaries without apparent signs or symptoms of cardiovascular disease Lab Results   Component Value Date    CHOLEST 212 (H) 03/18/2024    HDL 82 (H) 03/18/2024     (H) 03/18/2024    TRIG 113 03/18/2024         Electrocardiogram (EKG)   Covered if needed at Welcome to Medicare, and non-screening if indicated for medical reasons 02/27/2024      Ultrasound Screening for Abdominal Aortic Aneurysm (AAA) Covered once in a lifetime for one of the following risk factors   • Men who are 65-75 years old and have ever smoked   • Anyone with a family history -     Colorectal Cancer Screening  Covered for ages 50-85; only need ONE of the following:    Colonoscopy   Covered every 10 years    Covered every 2 years if patient is at high risk or previous colonoscopy was abnormal 10/04/2022    Health Maintenance   Topic Date Due   • Colorectal Cancer Screening  10/04/2024       Flexible Sigmoidoscopy   Covered every 4 years -    Fecal Occult Blood Test Covered annually -   Bone Density Screening    Bone density screening    Covered every 2 years after age 65 if diagnosed with risk of osteoporosis or estrogen deficiency.    Covered yearly for long-term glucocorticoid medication use (Steroids) Last  Dexa Scan:    XR DEXA BONE DENSITOMETRY (CPT=77080) 03/08/2023      No recommendations at this time   Pap and Pelvic    Pap   Covered every 2 years for women at normal risk; Annually if at high risk 10/19/2021  Health Maintenance   Topic Date Due   • Pap Smear  10/19/2024       Chlamydia Annually if high risk -  No recommendations at this time   Screening Mammogram    Mammogram     Recommend annually for all female patients aged 40 and older    One baseline mammogram covered for patients aged 35-39 01/17/2023    Health Maintenance   Topic Date Due   • Mammogram  01/17/2025       Immunizations    Influenza Covered once per flu season  Please get every year 09/27/2023  No recommendations at this time    Pneumococcal Each vaccine (Ezyjezq66 & Hqciemzto98) covered once after 65 Prevnar 13: 10/18/2017    Fywprqiiq56: 10/08/2019     Pneumococcal Vaccination(3 of 3 - PPSV23 or PCV20) due on 10/08/2024    Hepatitis B One screening covered for patients with certain risk factors   -  No recommendations at this time    Tetanus Toxoid Not covered by Medicare Part B unless medically necessary (cut with metal); may be covered with your pharmacy prescription benefits -    Tetanus, Diptheria and Pertusis TD and TDaP Not covered by Medicare Part B -  No recommendations at this time    Zoster Not covered by Medicare Part B; may be covered with your pharmacy  prescription benefits -  Zoster Vaccines(2 of 2) due on 01/29/2024      Jayda Mays's SCREENING SCHEDULE   Tests on this list are recommended by your physician but may not be covered, or covered at this frequency, by your insurer.   Please check with your insurance carrier before scheduling to verify coverage.   PREVENTATIVE SERVICES FREQUENCY &  COVERAGE DETAILS LAST COMPLETION DATE   Diabetes Screening    Fasting Blood Sugar /  Glucose    One screening every 12 months if never tested or if previously tested but not diagnosed with pre-diabetes   One screening every 6 months if  diagnosed with pre-diabetes Lab Results   Component Value Date    GLU 93 03/18/2024        Cardiovascular Disease Screening    Lipid Panel  Cholesterol  Lipoprotein (HDL)  Triglycerides Covered every 5 years for all Medicare beneficiaries without apparent signs or symptoms of cardiovascular disease Lab Results   Component Value Date    CHOLEST 212 (H) 03/18/2024    HDL 82 (H) 03/18/2024     (H) 03/18/2024    TRIG 113 03/18/2024         Electrocardiogram (EKG)   Covered if needed at Welcome to Medicare, and non-screening if indicated for medical reasons 02/27/2024      Ultrasound Screening for Abdominal Aortic Aneurysm (AAA) Covered once in a lifetime for one of the following risk factors   • Men who are 65-75 years old and have ever smoked   • Anyone with a family history -     Colorectal Cancer Screening  Covered for ages 50-85; only need ONE of the following:    Colonoscopy   Covered every 10 years    Covered every 2 years if patient is at high risk or previous colonoscopy was abnormal 10/04/2022    Health Maintenance   Topic Date Due   • Colorectal Cancer Screening  10/04/2024       Flexible Sigmoidoscopy   Covered every 4 years -    Fecal Occult Blood Test Covered annually -   Bone Density Screening    Bone density screening    Covered every 2 years after age 65 if diagnosed with risk of osteoporosis or estrogen deficiency.    Covered yearly for long-term glucocorticoid medication use (Steroids) Last Dexa Scan:    XR DEXA BONE DENSITOMETRY (CPT=77080) 03/08/2023      No recommendations at this time   Pap and Pelvic    Pap   Covered every 2 years for women at normal risk; Annually if at high risk 10/19/2021  Health Maintenance   Topic Date Due   • Pap Smear  10/19/2024       Chlamydia Annually if high risk -  No recommendations at this time   Screening Mammogram    Mammogram     Recommend annually for all female patients aged 40 and older    One baseline mammogram covered for patients aged 35-39  01/17/2023    Health Maintenance   Topic Date Due   • Mammogram  01/17/2025       Immunizations    Influenza Covered once per flu season  Please get every year 09/27/2023  No recommendations at this time    Pneumococcal Each vaccine (Dhnkdvq92 & Zjmnqdxyi98) covered once after 65 Prevnar 13: 10/18/2017    Rowkmqzvw56: 10/08/2019     Pneumococcal Vaccination(3 of 3 - PPSV23 or PCV20) due on 10/08/2024    Hepatitis B One screening covered for patients with certain risk factors   -  No recommendations at this time    Tetanus Toxoid Not covered by Medicare Part B unless medically necessary (cut with metal); may be covered with your pharmacy prescription benefits -    Tetanus, Diptheria and Pertusis TD and TDaP Not covered by Medicare Part B -  No recommendations at this time    Zoster Not covered by Medicare Part B; may be covered with your pharmacy  prescription benefits -  Zoster Vaccines(2 of 2) due on 01/29/2024

## 2024-04-29 NOTE — PROGRESS NOTES
Jayda Mays was screened today and had CAGE screening score of Total Score: 0 putting her at low risk of alcohol abuse.   Subjective:   Jayda Mays is a 68 year old female who presents for a Medicare Subsequent Annual Wellness visit (Pt already had Initial Annual Wellness) and scheduled follow up of multiple significant but stable problems.   Pt has been trying to treat urinary incontinence for the last year.  Derm is treating finger nails, they are not very much better.  Retiring in August. Plans to see grandsons x 5. Bilateral knee pain. Son is moving in to her home in MAY.     History/Other:   Fall Risk Assessment:   She has been screened for Falls and is low risk.      Cognitive Assessment:   She had a completely normal cognitive assessment - see flowsheet entries     Functional Ability/Status:   Jayda Mays has a completely normal functional assessment. See flowsheet for details.        Depression Screening (PHQ-2/PHQ-9): PHQ-2 SCORE: 0  , done 4/29/2024   Last [place] Suicide Screening on 4/29/2024 was No Risk.     <5 minutes spent screening and counseling for depression    Advanced Directives:   She does have a Living Will but we do NOT have it on file in Epic.    She does have a POA but we do NOT have it on file in Epic.    Discussed Advance Care Planning with patient (and family/surrogate if present). Standard forms made available to patient in After Visit Summary.      Patient Active Problem List   Diagnosis    Ulcerative colitis (HCC)    Obesity    Hypothyroid    Osteoarthritis    Left sided ulcerative (chronic) colitis (HCC)    Arthritis of carpometacarpal (CMC) joint of right thumb    Right wrist sprain, initial encounter    Polyp of colon     Allergies:  She has No Known Allergies.    Current Medications:  Outpatient Medications Marked as Taking for the 4/29/24 encounter (Office Visit) with Donna Verdugo MD   Medication Sig    amitriptyline 25 MG Oral Tab Take 1 tablet (25 mg total) by mouth nightly.     estradiol 0.1 MG/GM Vaginal Cream Place 1 g vaginally As Directed.    NON FORMULARY     ascorbic acid (C-1000) 1000 MG Oral Tab     levothyroxine 75 MCG Oral Tab Take 1 tablet (75 mcg total) by mouth before breakfast.    calcipotriene 0.005 % External Cream     Vedolizumab (ENTYVIO IV) Inject into the vein.    Lactobacillus (PROBIOTIC ACIDOPHILUS) Oral Cap Take 30,000,000 Units by mouth daily.    Glucosamine-Chondroit-Vit C-Mn (GLUCOSAMINE CHONDR 1500 COMPLX) Oral Cap Take  by mouth.    L-Lysine 1000 MG Oral Tab Take 1,000 mg by mouth daily.      CALCIUM CITRATE OR by Does not apply route.    Multiple Vitamin (MULTIVITAMIN OR) Take  by mouth.       Medical History:  She  has a past medical history of Belching, Blood in the stool (1 wk ago), Change in hair (nails extremly weak, brittle), Diarrhea, unspecified (2 wks ago), Eczema, Fatigue (2 wks ago), Food intolerance, Hypothyroidism, Irregular bowel habits, Loss of appetite (2 wks ago), Menopausal state, Obesity, unspecified, Osteoarthritis, Pain in joints (4 yrs ago), Pain with bowel movements, Rash (), Stool incontinence (2 wks ago), Stress (I do not remember), Ulcerative colitis (HCC), Ulcerative colitis (HCC), Vitamin D deficiency, Wears glasses (), and Weight loss (2 wks ago).  Surgical History:  She  has a past surgical history that includes ; colonoscopy; sigmoidoscopy,diagnostic; and carpal tunnel release.   Family History:  Her family history includes Cancer (age of onset: 87) in her mother; Colon Cancer in her mother; Heart Attack in her father; Heart Disorder in her brother and father; Hypertension in her father.  Social History:  She  reports that she quit smoking about 45 years ago. Her smoking use included cigarettes. She started smoking about 55 years ago. She has a 10 pack-year smoking history. She has never used smokeless tobacco. She reports current alcohol use of about 2.5 standard drinks of alcohol per week. She reports that  she does not use drugs.    Tobacco:  She smoked tobacco in the past but quit greater than 12 months ago.  Social History     Tobacco Use   Smoking Status Former    Current packs/day: 0.00    Average packs/day: 1 pack/day for 10.0 years (10.0 ttl pk-yrs)    Types: Cigarettes    Start date: 1969    Quit date: 1979    Years since quittin.0   Smokeless Tobacco Never          CAGE Alcohol Screen:   CAGE screening score of 0 on 2024, showing low risk of alcohol abuse.      Patient Care Team:  Donna Verdugo MD as PCP - General ([facility])  Dr Campbell Hepatologist  Drake Hackett MD (SURGERY, ORTHOPEDIC)  Cesar Baca MD ([facility])  Allsion Bray PT as Physical Therapist (Physical Therapy)  Erasto Uribe MD (GASTROENTEROLOGY)  Donna Verdugo MD (Family Medicine)    Review of Systems  GENERAL: feels well otherwise  SKIN: denies any unusual skin lesions  EYES: denies blurred vision or double vision  HEENT: denies nasal congestion, sinus pain or ST  LUNGS: denies shortness of breath with exertion  CARDIOVASCULAR: denies chest pain on exertion  GI: denies abdominal pain, denies heartburn  : denies dysuria, vaginal discharge or itching, no complaint of urinary incontinence   MUSCULOSKELETAL: denies back pain  NEURO: denies headaches  PSYCHE: denies depression or anxiety  HEMATOLOGIC: denies hx of anemia  ENDOCRINE: denies thyroid history  ALL/ASTHMA: denies hx of allergy or asthma    Objective:   Physical Exam  General Appearance:  Alert, cooperative, no distress, appears stated age   Head:  Normocephalic, without obvious abnormality, atraumatic   Eyes:  PERRL, conjunctiva/corneas clear, EOM's intact both eyes   Ears:  Normal TM's and external ear canals, both ears   Nose: Nares normal, septum midline,mucosa normal, no drainage or sinus tenderness   Throat: Lips, mucosa, and tongue normal; teeth and gums normal   Neck: Supple, symmetrical, trachea midline, no adenopathy;   thyroid: not enlarged, symmetric, no tenderness/mass/nodules; no carotid bruit or JVD   Back:   Symmetric, no curvature, ROM normal, no CVA tenderness   Lungs:   Clear to auscultation bilaterally, respirations unlabored   Heart:  Regular rate and rhythm, S1 and S2 normal, no murmur, rub, or gallop   Abdomen:   Soft, non-tender, bowel sounds active all four quadrants,  no masses, no organomegaly   Pelvic: Deferred   Extremities: Extremities normal, atraumatic, no cyanosis or edema   Pulses: 2+ and symmetric   Skin: Skin color, texture, turgor normal, no rashes or lesions   Lymph nodes: Cervical, supraclavicular, and axillary nodes normal   Neurologic: Normal       /76   Pulse 78   Temp 99.2 °F (37.3 °C) (Temporal)   Resp 18   Ht 5' 3\" (1.6 m)   Wt 200 lb (90.7 kg)   LMP 10/01/2012   SpO2 97%   BMI 35.43 kg/m²  Estimated body mass index is 35.43 kg/m² as calculated from the following:    Height as of this encounter: 5' 3\" (1.6 m).    Weight as of this encounter: 200 lb (90.7 kg).    Medicare Hearing Assessment:   Hearing Screening    Time taken: 4/29/2024  9:24 AM  Entry User: Donna Verdugo MD  Screening Method: Whisper Test  Whisper Test Result: Pass         Visual Acuity:   Right Eye Visual Acuity: Uncorrected Right Eye Chart Acuity: 20/40   Left Eye Visual Acuity: Uncorrected Left Eye Chart Acuity: 20/40   Both Eyes Visual Acuity: Uncorrected Both Eyes Chart Acuity: 20/30   Able To Tolerate Visual Acuity: Yes        Assessment & Plan:   Jayda Mays is a 68 year old female who presents for a Medicare Assessment.     1. Vitamin D deficiency (Primary)  2. Acquired hypothyroidism  3. Hypercholesteremia  4. Postmenopausal  5. Screening for hypercholesterolemia  6. Encounter for screening mammogram for malignant neoplasm of breast  7. Mixed stress and urge urinary incontinence  8. Screening for alcohol problem  -     Annual Alcohol Screening for Misuse- 15+ minutes (not with Welcome to Medicare)  9.  Encounter for annual health examination  -     ThinPrep PAP with HPV Reflex Request B; Future; Expected date: 04/29/2024  10. Encounter for gynecological examination without abnormal finding  -     Breast and Pelvic Exam, Covered every 2 years []  -     Obtain Pap Smear, Low risk []  (Every 2 Years)  1. Vitamin D deficiency  Too high in March, recheck in JUNE    2. Acquired hypothyroidism  Well controlled, CCM    3. Hypercholesteremia  Well controlled, CCM    4. Postmenopausal  DEXA normal    5. Screening for hypercholesterolemia  Well controlled,     6. Encounter for screening mammogram for malignant neoplasm of breast  done    7. Mixed stress and urge urinary incontinence  Elavil and estrogen cream    8. Screening for alcohol problem  Wine at night  - Annual Alcohol Screening for Misuse- 15+ minutes (not with Welcome to Medicare)    9. Encounter for annual health examination  done  - ThinPrep PAP with HPV Reflex Request; Future    10. Encounter for gynecological examination without abnormal finding  PAP done.   - Breast and Pelvic Exam, Covered every 2 years []  - Obtain Pap Smear, Low risk []  (Every 2 Years)    The patient indicates understanding of these issues and agrees to the plan.  Reinforced healthy diet, lifestyle, and exercise.      No follow-ups on file.     Donna Verdugo MD, 4/29/2024     Supplementary Documentation:   General Health:  In the past six months, have you lost more than 10 pounds without trying?: 2 - No  Has your appetite been poor?: No  Type of Diet: Balanced  How does the patient maintain a good energy level?: Appropriate Exercise;Other  How would you describe your daily physical activity?: Moderate  How would you describe your current health state?: Good  How do you maintain positive mental well-being?: Social Interaction;Visiting Family  On a scale of 0 to 10, with 0 being no pain and 10 being severe pain, what is your pain level?: 3 - (Mild)  In the past six  months, have you experienced urine leakage?: 0-No  At any time do you feel concerned for the safety/well-being of yourself and/or your children, in your home or elsewhere?: No  Have you had any immunizations at another office such as Influenza, Hepatitis B, Tetanus, or Pneumococcal?: No         Jayda Mays's SCREENING SCHEDULE   Tests on this list are recommended by your physician but may not be covered, or covered at this frequency, by your insurer.   Please check with your insurance carrier before scheduling to verify coverage.   PREVENTATIVE SERVICES FREQUENCY &  COVERAGE DETAILS LAST COMPLETION DATE   Diabetes Screening    Fasting Blood Sugar /  Glucose    One screening every 12 months if never tested or if previously tested but not diagnosed with pre-diabetes   One screening every 6 months if diagnosed with pre-diabetes Lab Results   Component Value Date    GLU 93 03/18/2024        Cardiovascular Disease Screening    Lipid Panel  Cholesterol  Lipoprotein (HDL)  Triglycerides Covered every 5 years for all Medicare beneficiaries without apparent signs or symptoms of cardiovascular disease Lab Results   Component Value Date    CHOLEST 212 (H) 03/18/2024    HDL 82 (H) 03/18/2024     (H) 03/18/2024    TRIG 113 03/18/2024         Electrocardiogram (EKG)   Covered if needed at Welcome to Medicare, and non-screening if indicated for medical reasons 02/27/2024      Ultrasound Screening for Abdominal Aortic Aneurysm (AAA) Covered once in a lifetime for one of the following risk factors    Men who are 65-75 years old and have ever smoked    Anyone with a family history -     Colorectal Cancer Screening  Covered for ages 50-85; only need ONE of the following:    Colonoscopy   Covered every 10 years    Covered every 2 years if patient is at high risk or previous colonoscopy was abnormal 10/04/2022    Health Maintenance   Topic Date Due    Colorectal Cancer Screening  10/04/2024       Flexible Sigmoidoscopy   Covered  every 4 years -    Fecal Occult Blood Test Covered annually -   Bone Density Screening    Bone density screening    Covered every 2 years after age 65 if diagnosed with risk of osteoporosis or estrogen deficiency.    Covered yearly for long-term glucocorticoid medication use (Steroids) Last Dexa Scan:    XR DEXA BONE DENSITOMETRY (CPT=77080) 03/08/2023      No recommendations at this time   Pap and Pelvic    Pap   Covered every 2 years for women at normal risk; Annually if at high risk 10/19/2021  Health Maintenance   Topic Date Due    Pap Smear  10/19/2024       Chlamydia Annually if high risk -  No recommendations at this time   Screening Mammogram    Mammogram     Recommend annually for all female patients aged 40 and older    One baseline mammogram covered for patients aged 35-39 01/17/2023    Health Maintenance   Topic Date Due    Mammogram  01/17/2025       Immunizations    Influenza Covered once per flu season  Please get every year 09/27/2023  No recommendations at this time    Pneumococcal Each vaccine (Foimmku54 & Jqxtuwvfq77) covered once after 65 Prevnar 13: 10/18/2017    Cftqqhpcl37: 10/08/2019     Pneumococcal Vaccination(3 of 3 - PPSV23 or PCV20) due on 10/08/2024    Hepatitis B One screening covered for patients with certain risk factors   -  No recommendations at this time    Tetanus Toxoid Not covered by Medicare Part B unless medically necessary (cut with metal); may be covered with your pharmacy prescription benefits -    Tetanus, Diptheria and Pertusis TD and TDaP Not covered by Medicare Part B -  No recommendations at this time    Zoster Not covered by Medicare Part B; may be covered with your pharmacy  prescription benefits -  Zoster Vaccines(2 of 2) due on 01/29/2024

## 2024-05-03 LAB
.: NORMAL
.: NORMAL
HPV I/H RISK 1 DNA SPEC QL NAA+PROBE: NEGATIVE

## 2024-05-07 ENCOUNTER — TELEPHONE (OUTPATIENT)
Dept: FAMILY MEDICINE CLINIC | Facility: CLINIC | Age: 69
End: 2024-05-07

## 2024-05-07 RX ORDER — LEVOTHYROXINE SODIUM 0.07 MG/1
75 TABLET ORAL
Qty: 90 TABLET | Refills: 3 | Status: SHIPPED | OUTPATIENT
Start: 2024-05-07

## 2024-05-07 NOTE — TELEPHONE ENCOUNTER
Patient said that she received a call from us about lab results. She states she saw them online. She needs a refill  on the Levothyroxine to [facility].   Medication refilled for 1 year V.O. Dr Verdugo/[facility]

## 2024-05-07 NOTE — TELEPHONE ENCOUNTER
----- Message from Christina Schwab sent at 5/7/2024 11:01 AM CDT -----  RETURNING NURSES CALL FOR RESULTS, CALL PT BACK @ [phone]

## 2024-05-15 ENCOUNTER — PATIENT MESSAGE (OUTPATIENT)
Dept: FAMILY MEDICINE CLINIC | Facility: CLINIC | Age: 69
End: 2024-05-15

## 2024-05-15 ENCOUNTER — TELEPHONE (OUTPATIENT)
Dept: FAMILY MEDICINE CLINIC | Facility: CLINIC | Age: 69
End: 2024-05-15

## 2024-05-15 NOTE — TELEPHONE ENCOUNTER
Patient states that Dr Dominique wants her to keep track of how much she is voiding at night Patient advised that we can leave a hat with calibrations up front for her to .

## 2024-05-15 NOTE — TELEPHONE ENCOUNTER
PATIENT WOULD LIKE TO  URINE CONTAINER TO MEASURE URINE FOR DR. ASH. DOES NOT WISH TO DRIVE ALL THE WAY TO [place] TO PICK ONE UP. CAN SHE GET ONE FROM OUR OFFICE?

## 2024-05-15 NOTE — TELEPHONE ENCOUNTER
From: Jayda Mays  To: Donna Verdugo  Sent: 5/15/2024 12:00 PM CDT  Subject: Question     Hi. Dr. Dominique would like me to keep a urine voiding log for a couple weeks. Said I could drive to [facility] in [place] to  the voiding, measure container.(I see him again June 4th) I would like to know if you will provide that so I can avoid the hour drive? I left [facility]. today regarding   same. Thank you

## 2024-06-10 ENCOUNTER — PATIENT MESSAGE (OUTPATIENT)
Dept: FAMILY MEDICINE CLINIC | Facility: CLINIC | Age: 69
End: 2024-06-10

## 2024-06-10 DIAGNOSIS — E67.3 HIGH VITAMIN D LEVEL: Primary | ICD-10-CM

## 2024-06-10 NOTE — TELEPHONE ENCOUNTER
From: Jayda Mays  To: Donna Verdugo  Sent: 6/10/2024 12:52 PM CDT  Subject: Vitamin D     Hi! Back in March when my D level was too high, you said we could re check it in 3 months. Will you please place the order some time today? Thank you!

## 2024-06-11 ENCOUNTER — LAB ENCOUNTER (OUTPATIENT)
Dept: LAB | Age: 69
End: 2024-06-11
Attending: INTERNAL MEDICINE
Payer: MEDICARE

## 2024-06-11 DIAGNOSIS — E67.3 HIGH VITAMIN D LEVEL: ICD-10-CM

## 2024-06-11 PROCEDURE — 36415 COLL VENOUS BLD VENIPUNCTURE: CPT

## 2024-06-11 PROCEDURE — 82652 VIT D 1 25-DIHYDROXY: CPT

## 2024-06-14 LAB — VIT D 1,25 DI-OH: 32.7 PG/ML

## 2024-09-09 ENCOUNTER — TELEPHONE (OUTPATIENT)
Dept: FAMILY MEDICINE CLINIC | Facility: CLINIC | Age: 69
End: 2024-09-09

## 2024-09-09 NOTE — TELEPHONE ENCOUNTER
Called patient and left message that her papers were ready to    [[id number]] : \par 47 year old female presents c/o persistent headache occurring to the back of her head associated with a pulsating sensation, almost like a "movement" that is occurring to the back of her head\par can occur multiple times in a day, lasts for about an hour\par occurs more at night when she is laying down\par started about 1-2 months ago\par no nausea or vomiting\par sometimes with blurred vision\par sometimes with numbness to her left arm and hand\par has been following with a chiropractor, getting adjustments which helps\par headaches improve if she stops looking at her computer and rests \par headaches feel different than her usual migraines

## 2024-10-16 ENCOUNTER — PATIENT MESSAGE (OUTPATIENT)
Dept: FAMILY MEDICINE CLINIC | Facility: CLINIC | Age: 69
End: 2024-10-16

## 2025-01-13 ENCOUNTER — OFFICE VISIT (OUTPATIENT)
Dept: FAMILY MEDICINE CLINIC | Facility: CLINIC | Age: 70
End: 2025-01-13
Payer: MEDICARE

## 2025-01-13 VITALS
WEIGHT: 198.25 LBS | TEMPERATURE: 99 F | RESPIRATION RATE: 18 BRPM | OXYGEN SATURATION: 96 % | BODY MASS INDEX: 35 KG/M2 | SYSTOLIC BLOOD PRESSURE: 132 MMHG | HEART RATE: 72 BPM | DIASTOLIC BLOOD PRESSURE: 80 MMHG

## 2025-01-13 DIAGNOSIS — R05.2 SUBACUTE COUGH: Primary | ICD-10-CM

## 2025-01-13 PROCEDURE — 99214 OFFICE O/P EST MOD 30 MIN: CPT | Performed by: INTERNAL MEDICINE

## 2025-01-13 RX ORDER — FLUTICASONE PROPIONATE 110 UG/1
1 AEROSOL, METERED RESPIRATORY (INHALATION) 2 TIMES DAILY
Qty: 1 EACH | Refills: 2 | Status: SHIPPED | OUTPATIENT
Start: 2025-01-13 | End: 2025-01-16

## 2025-01-13 RX ORDER — CODEINE PHOSPHATE AND GUAIFENESIN 10; 100 MG/5ML; MG/5ML
5 SOLUTION ORAL EVERY 6 HOURS PRN
Qty: 180 ML | Refills: 0 | Status: SHIPPED | OUTPATIENT
Start: 2025-01-13 | End: 2025-01-23

## 2025-01-13 NOTE — H&P
HPI:   Jayda Mays is a 69 year old female who presents for upper respiratory symptoms for  2  weeks. Patient reports sore throat, congestion.    Current Outpatient Medications   Medication Sig Dispense Refill    levothyroxine 75 MCG Oral Tab Take 1 tablet (75 mcg total) by mouth before breakfast. 90 tablet 3    amitriptyline 25 MG Oral Tab Take 1 tablet (25 mg total) by mouth nightly.      estradiol 0.1 MG/GM Vaginal Cream Place 1 g vaginally As Directed.      NON FORMULARY       NON FORMULARY       ascorbic acid (C-1000) 1000 MG Oral Tab       calcipotriene 0.005 % External Cream       Vedolizumab (ENTYVIO IV) Inject into the vein.      Lactobacillus (PROBIOTIC ACIDOPHILUS) Oral Cap Take 30,000,000 Units by mouth daily.      Glucosamine-Chondroit-Vit C-Mn (GLUCOSAMINE CHONDR 1500 COMPLX) Oral Cap Take  by mouth.      L-Lysine 1000 MG Oral Tab Take 1,000 mg by mouth daily.        CALCIUM CITRATE OR by Does not apply route.      Multiple Vitamin (MULTIVITAMIN OR) Take  by mouth.        Past Medical History:    Belching    Blood in the stool    Change in hair    Diarrhea, unspecified    Eczema    Fatigue    Food intolerance    Hypothyroidism    Irregular bowel habits    Loss of appetite    Menopausal state    Obesity, unspecified    Osteoarthritis    Pain in joints    upon kneeling    Pain with bowel movements    Rash    chronic eczema    Stool incontinence    Stress    Ulcerative colitis (HCC)    Ulcerative colitis (HCC)    Vitamin D deficiency    Wears glasses    reading glasses    Weight loss      Past Surgical History:   Procedure Laterality Date          Carpal tunnel release      Colonoscopy      Sigmoidoscopy,diagnostic        Family History   Problem Relation Age of Onset    Heart Disorder Father     Hypertension Father         age 53    Heart Attack Father         53    Cancer Mother 87        colon cancer    Colon Cancer Mother         86    Heart Disorder Brother       Social History      Socioeconomic History    Marital status:    Tobacco Use    Smoking status: Former     Current packs/day: 0.00     Average packs/day: 1 pack/day for 10.0 years (10.0 ttl pk-yrs)     Types: Cigarettes     Start date: 1969     Quit date: 1979     Years since quittin.7    Smokeless tobacco: Never   Substance and Sexual Activity    Alcohol use: Yes     Alcohol/week: 2.5 standard drinks of alcohol     Types: 3 Glasses of wine per week     Comment: 3 glasses of wine  per week    Drug use: No     Social Drivers of Health      Received from [facility], [facility]    Social Connections    Received from [facility], [facility]    Housing Stability         REVIEW OF SYSTEMS:   GENERAL: feels well otherwise  SKIN: no rashes  EYES:denies blurred vision or double vision  HEENT: congested  LUNGS: denies shortness of breath with exertion  CARDIOVASCULAR: denies chest pain on exertion  GI: no nausea or abdominal pain  NEURO: denies headaches    EXAM:   LMP 10/01/2012   GENERAL: well developed, well nourished,in no apparent distress  SKIN: no rashes,no suspicious lesions  EYES:PERRLA, EOMI, normal optic disk,conjunctiva are clear  HEENT: atraumatic, normocephalic,ears and throat are clear  NECK: supple,no adenopathy,no bruits  LUNGS: clear to auscultation  CARDIO: RRR without murmur  GI: good BS's,no masses, HSM or tenderness    ASSESSMENT AND PLAN:   Jayda Mays is a 69 year old female who presents with Bronchitis with Bronchospasm. PLAN: OTC decongestants, throat lozenges and tylenol and guafenacine and codeine .  The patient indicates understanding of these issues and agrees to the plan.  The patient is asked to return if sx's persist or worsen.

## 2025-01-14 ENCOUNTER — TELEPHONE (OUTPATIENT)
Dept: FAMILY MEDICINE CLINIC | Facility: CLINIC | Age: 70
End: 2025-01-14

## 2025-01-14 RX ORDER — FLUTICASONE PROPIONATE 100 UG/1
2 POWDER, METERED RESPIRATORY (INHALATION) DAILY
Qty: 1 EACH | Refills: 0 | Status: SHIPPED | OUTPATIENT
Start: 2025-01-14 | End: 2025-01-16

## 2025-01-14 NOTE — TELEPHONE ENCOUNTER
Patient said that there is a delay in getting the Fluticasone HFA and she has Fluticasone nasal spray 50mcg at home. Can she take this until the prescription is filled? Advised fax was received from [facility] stating that the Fluticasone Diskus is preferred but may require a prior authorization.  She also asked if she should alternate taking the Mucinex and the Guafenesin- Codeine.

## 2025-01-14 NOTE — TELEPHONE ENCOUNTER
She can take the mucinex in the day and cough med at night.     You can use the flonase infranasal.

## 2025-01-15 ENCOUNTER — TELEPHONE (OUTPATIENT)
Dept: FAMILY MEDICINE CLINIC | Facility: CLINIC | Age: 70
End: 2025-01-15

## 2025-01-15 RX ORDER — FLUTICASONE FUROATE 100 UG/1
2 POWDER RESPIRATORY (INHALATION) DAILY
Qty: 1 EACH | Refills: 3 | Status: SHIPPED | OUTPATIENT
Start: 2025-01-15 | End: 2025-01-16 | Stop reason: ALTCHOICE

## 2025-01-15 NOTE — TELEPHONE ENCOUNTER
[facility] was unable to find patient in their system.  Attempted to do prior authorization on Cover My Meds and it was rejected stating that [employer] or [employer] are likely covered.

## 2025-01-16 RX ORDER — BUDESONIDE, GLYCOPYRROLATE, AND FORMOTEROL FUMARATE 160; 9; 4.8 UG/1; UG/1; UG/1
2 AEROSOL, METERED RESPIRATORY (INHALATION) DAILY
Qty: 1 EACH | Refills: 0 | COMMUNITY
Start: 2025-01-16

## 2025-01-16 NOTE — TELEPHONE ENCOUNTER
Patient said that the inhaler will be $200. She said that her cough is loose and she is coughing up mucous. She is short of breath with activity. Does Dr Verdugo recomd an alternative?

## 2025-01-21 ENCOUNTER — TELEPHONE (OUTPATIENT)
Dept: FAMILY MEDICINE CLINIC | Facility: CLINIC | Age: 70
End: 2025-01-21

## 2025-01-21 ENCOUNTER — OFFICE VISIT (OUTPATIENT)
Dept: FAMILY MEDICINE CLINIC | Facility: CLINIC | Age: 70
End: 2025-01-21
Payer: MEDICARE

## 2025-01-21 VITALS
DIASTOLIC BLOOD PRESSURE: 80 MMHG | TEMPERATURE: 100 F | OXYGEN SATURATION: 97 % | RESPIRATION RATE: 18 BRPM | SYSTOLIC BLOOD PRESSURE: 128 MMHG | HEART RATE: 72 BPM

## 2025-01-21 DIAGNOSIS — R43.2 LOSS OF TASTE: Primary | ICD-10-CM

## 2025-01-21 PROCEDURE — 99214 OFFICE O/P EST MOD 30 MIN: CPT | Performed by: INTERNAL MEDICINE

## 2025-01-21 PROCEDURE — 87637 SARSCOV2&INF A&B&RSV AMP PRB: CPT | Performed by: INTERNAL MEDICINE

## 2025-01-21 RX ORDER — AMOXICILLIN AND CLAVULANATE POTASSIUM 500; 125 MG/1; MG/1
1 TABLET, FILM COATED ORAL 3 TIMES DAILY
Qty: 30 TABLET | Refills: 0 | Status: SHIPPED | OUTPATIENT
Start: 2025-01-21 | End: 2025-01-31

## 2025-01-21 NOTE — TELEPHONE ENCOUNTER
Patient advised. She can add Flonase nasal Spray and continue the inhalers. V.O. Dr Verdugo/Velvet LAWTON  She asked if Dr Verdugo can swab her to determine what she has. She states her son and Grandson are living with her. Appointment scheduled with Dr Verdugo at 3pm today.

## 2025-01-21 NOTE — TELEPHONE ENCOUNTER
Patient said that her ears are plugged. She she said she is still coughing, but no shortness of breath or wheezing. She is taking Breztri 2 puffs daily and Albuterol as needed. She states that she has a lot of sinus congestion. She is not using the Flonase. She said this is the 12th day of being sick and wonders if she should take an antibiotic. She can't take Zithromax.  Also should she continue the Mucinex?

## 2025-01-21 NOTE — TELEPHONE ENCOUNTER
She has a question about the medication she is taking and her symptoms have gone to her sinuses.  Offered to make an appointment but she wants to talk to a nurse

## 2025-01-22 ENCOUNTER — TELEPHONE (OUTPATIENT)
Dept: FAMILY MEDICINE CLINIC | Facility: CLINIC | Age: 70
End: 2025-01-22

## 2025-01-22 LAB
FLUAV + FLUBV RNA SPEC NAA+PROBE: NOT DETECTED
FLUAV + FLUBV RNA SPEC NAA+PROBE: NOT DETECTED
RSV RNA SPEC NAA+PROBE: DETECTED
SARS-COV-2 RNA RESP QL NAA+PROBE: NOT DETECTED

## 2025-01-22 NOTE — PROGRESS NOTES
Jayda Mays is a 69 year old female.  HPI:   Pt has been having cough and sticky throat for greater than a week,   Current Outpatient Medications   Medication Sig Dispense Refill    amoxicillin clavulanate (AUGMENTIN) 500-125 MG Oral Tab Take 1 tablet by mouth 3 (three) times daily for 10 days. 30 tablet 0    budeson-glycopyrrol-formoterol (BREZTRI AEROSPHERE) 160-9-4.8 MCG/ACT Inhalation Aerosol Inhale 2 puffs into the lungs daily. Lot #[id number] exp 2/27 1 each 0    guaiFENesin-codeine 100-10 MG/5ML Oral Solution Take 5 mL by mouth every 6 (six) hours as needed for cough. 180 mL 0    levothyroxine 75 MCG Oral Tab Take 1 tablet (75 mcg total) by mouth before breakfast. 90 tablet 3    amitriptyline 25 MG Oral Tab Take 1 tablet (25 mg total) by mouth nightly.      estradiol 0.1 MG/GM Vaginal Cream Place 1 g vaginally As Directed.      NON FORMULARY       NON FORMULARY       ascorbic acid (C-1000) 1000 MG Oral Tab       calcipotriene 0.005 % External Cream       Vedolizumab (ENTYVIO IV) Inject into the vein.      Lactobacillus (PROBIOTIC ACIDOPHILUS) Oral Cap Take 30,000,000 Units by mouth daily.      Glucosamine-Chondroit-Vit C-Mn (GLUCOSAMINE CHONDR 1500 COMPLX) Oral Cap Take  by mouth.      L-Lysine 1000 MG Oral Tab Take 1,000 mg by mouth daily.        CALCIUM CITRATE OR by Does not apply route.      Multiple Vitamin (MULTIVITAMIN OR) Take  by mouth.        Past Medical History:    Belching    Blood in the stool    Change in hair    Diarrhea, unspecified    Eczema    Fatigue    Food intolerance    Hypothyroidism    Irregular bowel habits    Loss of appetite    Menopausal state    Obesity, unspecified    Osteoarthritis    Pain in joints    upon kneeling    Pain with bowel movements    Rash    chronic eczema    Stool incontinence    Stress    Ulcerative colitis (HCC)    Ulcerative colitis (HCC)    Vitamin D deficiency    Wears glasses    reading glasses    Weight loss      Social History:  Social History      Socioeconomic History    Marital status:    Tobacco Use    Smoking status: Former     Current packs/day: 0.00     Average packs/day: 1 pack/day for 10.0 years (10.0 ttl pk-yrs)     Types: Cigarettes     Start date: 1969     Quit date: 1979     Years since quittin.7    Smokeless tobacco: Never   Substance and Sexual Activity    Alcohol use: Yes     Alcohol/week: 2.5 standard drinks of alcohol     Types: 3 Glasses of wine per week     Comment: 3 glasses of wine  per week    Drug use: No     Social Drivers of Health      [facility]    Social Connections    [facility] from [facility]    Housing Stability        REVIEW OF SYSTEMS:   GENERAL HEALTH: feels well otherwise  SKIN: denies any unusual skin lesions or rashes  RESPIRATORY: denies shortness of breath with exertion  CARDIOVASCULAR: denies chest pain on exertion  GI: denies abdominal pain and denies heartburn  NEURO: denies headaches    EXAM:   /80   Pulse 72   Temp 99.9 °F (37.7 °C) (Temporal)   Resp 18   LMP 10/01/2012   SpO2 97%   GENERAL: well developed, well nourished,in no apparent distress  SKIN: no rashes,no suspicious lesions  HEENT: atraumatic, normocephalic,ears and throat are clear  NECK: supple,no adenopathy,no bruits  LUNGS: clear to auscultation  CARDIO: RRR without murmur  GI: good BS's,no masses, HSM or tenderness  EXTREMITIES: no cyanosis, clubbing or edema    ASSESSMENT AND PLAN:     Encounter Diagnosis   Name Primary?    Loss of taste Yes   Check viral markers. Maybe RSV    Orders Placed This Encounter   Procedures    SARS-CoV-2/Flu A and B/RSV by PCR (Tonya) [E]       Meds & Refills for this Visit:  Requested Prescriptions      No prescriptions requested or ordered in this encounter       Imaging & Consults:  None    Follow up as needed.     The patient indicates understanding of these issues  and agrees to the plan.

## 2025-01-22 NOTE — TELEPHONE ENCOUNTER
Patient said she shows she has RSV. She said she thinks she got the RSV injection last year or 2023. Should she get another one? She asked if she should stop any of the medications she is taking. She started the antibiotic.

## 2025-01-22 NOTE — TELEPHONE ENCOUNTER
She has a question on the medication she is taking because of the results she has.  She said you can call either of her phone numbers, home or cell phone

## 2025-02-11 ENCOUNTER — PATIENT MESSAGE (OUTPATIENT)
Dept: FAMILY MEDICINE CLINIC | Facility: CLINIC | Age: 70
End: 2025-02-11

## 2025-02-11 DIAGNOSIS — E03.9 ACQUIRED HYPOTHYROIDISM: ICD-10-CM

## 2025-02-11 DIAGNOSIS — E55.9 VITAMIN D DEFICIENCY: ICD-10-CM

## 2025-02-11 DIAGNOSIS — Z00.00 ENCOUNTER FOR ANNUAL HEALTH EXAMINATION: Primary | ICD-10-CM

## 2025-02-11 DIAGNOSIS — E78.00 HYPERCHOLESTEREMIA: ICD-10-CM

## 2025-02-11 NOTE — TELEPHONE ENCOUNTER
Future Appointments   Date Time Provider Department Center   4/10/2025  8:00 AM Erasto Uribe MD [facility] GI   4/10/2025 10:15 AM Donna Verdugo MD [facility] [place]   4/10/2025 10:45 AM REF [facility] REF [facility] Ref [facility]     Labs pended for approval if appropriate.  Thank you.

## 2025-02-14 ENCOUNTER — TELEPHONE (OUTPATIENT)
Dept: FAMILY MEDICINE CLINIC | Facility: CLINIC | Age: 70
End: 2025-02-14

## 2025-03-28 ENCOUNTER — LABORATORY ENCOUNTER (OUTPATIENT)
Dept: LAB | Age: 70
End: 2025-03-28
Attending: INTERNAL MEDICINE
Payer: MEDICARE

## 2025-03-28 DIAGNOSIS — K51.50 ULCERATIVE COLITIS, LEFT SIDED, WITHOUT COMPLICATIONS (HCC): ICD-10-CM

## 2025-03-28 PROCEDURE — 83993 ASSAY FOR CALPROTECTIN FECAL: CPT

## 2025-03-30 LAB — CALPROTECTIN STL-MCNT: 7.45 ΜG/G (ref ?–50)

## 2025-03-31 ENCOUNTER — TELEPHONE (OUTPATIENT)
Dept: FAMILY MEDICINE CLINIC | Facility: CLINIC | Age: 70
End: 2025-03-31

## 2025-03-31 NOTE — TELEPHONE ENCOUNTER
Patient asked if she can take Trospium tonight and Levothyroxine in the morning. She is having fasting labs done tomorrow.

## 2025-03-31 NOTE — TELEPHONE ENCOUNTER
Question about the medication she takes at night and in the morning since she is having labs done in the morning

## 2025-04-01 ENCOUNTER — LABORATORY ENCOUNTER (OUTPATIENT)
Dept: LAB | Age: 70
End: 2025-04-01
Attending: INTERNAL MEDICINE
Payer: MEDICARE

## 2025-04-01 DIAGNOSIS — K51.50 ULCERATIVE COLITIS, LEFT SIDED, WITHOUT COMPLICATIONS (HCC): ICD-10-CM

## 2025-04-01 LAB
ALBUMIN SERPL-MCNC: 4.9 G/DL (ref 3.2–4.8)
ALBUMIN/GLOB SERPL: 1.6 {RATIO} (ref 1–2)
ALP LIVER SERPL-CCNC: 163 U/L
ALT SERPL-CCNC: 29 U/L
ANION GAP SERPL CALC-SCNC: 8 MMOL/L (ref 0–18)
AST SERPL-CCNC: 26 U/L (ref ?–34)
BASOPHILS # BLD AUTO: 0.04 X10(3) UL (ref 0–0.2)
BASOPHILS NFR BLD AUTO: 0.5 %
BILIRUB SERPL-MCNC: 0.7 MG/DL (ref 0.2–1.1)
BUN BLD-MCNC: 14 MG/DL (ref 9–23)
CALCIUM BLD-MCNC: 10.6 MG/DL (ref 8.7–10.6)
CHLORIDE SERPL-SCNC: 101 MMOL/L (ref 98–112)
CHOLEST SERPL-MCNC: 249 MG/DL (ref ?–200)
CO2 SERPL-SCNC: 30 MMOL/L (ref 21–32)
CREAT BLD-MCNC: 0.91 MG/DL
CRP SERPL-MCNC: <0.4 MG/DL (ref ?–0.5)
EGFRCR SERPLBLD CKD-EPI 2021: 69 ML/MIN/1.73M2 (ref 60–?)
EOSINOPHIL # BLD AUTO: 0.13 X10(3) UL (ref 0–0.7)
EOSINOPHIL NFR BLD AUTO: 1.6 %
ERYTHROCYTE [DISTWIDTH] IN BLOOD BY AUTOMATED COUNT: 12.4 %
FASTING PATIENT LIPID ANSWER: YES
FASTING STATUS PATIENT QL REPORTED: YES
GLOBULIN PLAS-MCNC: 3.1 G/DL (ref 2–3.5)
GLUCOSE BLD-MCNC: 106 MG/DL (ref 70–99)
HCT VFR BLD AUTO: 45.8 %
HDLC SERPL-MCNC: 91 MG/DL (ref 40–59)
HGB BLD-MCNC: 14.8 G/DL
IMM GRANULOCYTES # BLD AUTO: 0.03 X10(3) UL (ref 0–1)
IMM GRANULOCYTES NFR BLD: 0.4 %
LDLC SERPL CALC-MCNC: 144 MG/DL (ref ?–100)
LYMPHOCYTES # BLD AUTO: 1.62 X10(3) UL (ref 1–4)
LYMPHOCYTES NFR BLD AUTO: 20.4 %
MCH RBC QN AUTO: 29.1 PG (ref 26–34)
MCHC RBC AUTO-ENTMCNC: 32.3 G/DL (ref 31–37)
MCV RBC AUTO: 90.2 FL
MONOCYTES # BLD AUTO: 0.62 X10(3) UL (ref 0.1–1)
MONOCYTES NFR BLD AUTO: 7.8 %
NEUTROPHILS # BLD AUTO: 5.49 X10 (3) UL (ref 1.5–7.7)
NEUTROPHILS # BLD AUTO: 5.49 X10(3) UL (ref 1.5–7.7)
NEUTROPHILS NFR BLD AUTO: 69.3 %
NONHDLC SERPL-MCNC: 158 MG/DL (ref ?–130)
OSMOLALITY SERPL CALC.SUM OF ELEC: 289 MOSM/KG (ref 275–295)
PLATELET # BLD AUTO: 247 10(3)UL (ref 150–450)
POTASSIUM SERPL-SCNC: 4.4 MMOL/L (ref 3.5–5.1)
PROT SERPL-MCNC: 8 G/DL (ref 5.7–8.2)
RBC # BLD AUTO: 5.08 X10(6)UL
SODIUM SERPL-SCNC: 139 MMOL/L (ref 136–145)
T4 FREE SERPL-MCNC: 1.9 NG/DL (ref 0.8–1.7)
TRIGL SERPL-MCNC: 82 MG/DL (ref 30–149)
TSI SER-ACNC: 1.41 UIU/ML (ref 0.55–4.78)
VIT D+METAB SERPL-MCNC: 76 NG/ML (ref 30–100)
VLDLC SERPL CALC-MCNC: 15 MG/DL (ref 0–30)
WBC # BLD AUTO: 7.9 X10(3) UL (ref 4–11)

## 2025-04-01 PROCEDURE — 80061 LIPID PANEL: CPT | Performed by: INTERNAL MEDICINE

## 2025-04-01 PROCEDURE — 86480 TB TEST CELL IMMUN MEASURE: CPT

## 2025-04-01 PROCEDURE — 80053 COMPREHEN METABOLIC PANEL: CPT | Performed by: INTERNAL MEDICINE

## 2025-04-01 PROCEDURE — 86140 C-REACTIVE PROTEIN: CPT

## 2025-04-01 PROCEDURE — 84439 ASSAY OF FREE THYROXINE: CPT | Performed by: INTERNAL MEDICINE

## 2025-04-01 PROCEDURE — 84443 ASSAY THYROID STIM HORMONE: CPT | Performed by: INTERNAL MEDICINE

## 2025-04-01 PROCEDURE — 85025 COMPLETE CBC W/AUTO DIFF WBC: CPT | Performed by: INTERNAL MEDICINE

## 2025-04-01 PROCEDURE — 36415 COLL VENOUS BLD VENIPUNCTURE: CPT | Performed by: INTERNAL MEDICINE

## 2025-04-01 PROCEDURE — 82306 VITAMIN D 25 HYDROXY: CPT | Performed by: INTERNAL MEDICINE

## 2025-04-02 DIAGNOSIS — K51.00 ULCERATIVE PANCOLITIS WITHOUT COMPLICATION (HCC): Primary | ICD-10-CM

## 2025-04-02 DIAGNOSIS — R74.8 ABNORMAL ALKALINE PHOSPHATASE TEST: ICD-10-CM

## 2025-04-03 ENCOUNTER — MED REC SCAN ONLY (OUTPATIENT)
Dept: FAMILY MEDICINE CLINIC | Facility: CLINIC | Age: 70
End: 2025-04-03

## 2025-04-03 LAB
M TB IFN-G CD4+ T-CELLS BLD-ACNC: 0.85 IU/ML
M TB TUBERC IFN-G BLD QL: NEGATIVE
M TB TUBERC IGNF/MITOGEN IGNF CONTROL: 5.58 IU/ML
QFT TB1 AG MINUS NIL: -0.15 IU/ML
QFT TB2 AG MINUS NIL: -0.19 IU/ML

## 2025-04-10 ENCOUNTER — OFFICE VISIT (OUTPATIENT)
Dept: FAMILY MEDICINE CLINIC | Facility: CLINIC | Age: 70
End: 2025-04-10
Payer: MEDICARE

## 2025-04-10 ENCOUNTER — LABORATORY ENCOUNTER (OUTPATIENT)
Dept: LAB | Age: 70
End: 2025-04-10
Attending: INTERNAL MEDICINE
Payer: MEDICARE

## 2025-04-10 VITALS — BODY MASS INDEX: 35.79 KG/M2 | WEIGHT: 202 LBS | TEMPERATURE: 98 F | RESPIRATION RATE: 18 BRPM | HEIGHT: 63 IN

## 2025-04-10 DIAGNOSIS — M15.0 PRIMARY OSTEOARTHRITIS INVOLVING MULTIPLE JOINTS: ICD-10-CM

## 2025-04-10 DIAGNOSIS — E03.9 ACQUIRED HYPOTHYROIDISM: ICD-10-CM

## 2025-04-10 DIAGNOSIS — Z00.00 ENCOUNTER FOR ANNUAL HEALTH EXAMINATION: ICD-10-CM

## 2025-04-10 DIAGNOSIS — R74.8 ABNORMAL ALKALINE PHOSPHATASE TEST: ICD-10-CM

## 2025-04-10 DIAGNOSIS — K51.20 ULCERATIVE PROCTITIS WITHOUT COMPLICATION (HCC): ICD-10-CM

## 2025-04-10 DIAGNOSIS — E66.09 OBESITY DUE TO EXCESS CALORIES WITHOUT SERIOUS COMORBIDITY, UNSPECIFIED CLASS: ICD-10-CM

## 2025-04-10 DIAGNOSIS — Z12.31 SCREENING MAMMOGRAM, ENCOUNTER FOR: Primary | ICD-10-CM

## 2025-04-10 DIAGNOSIS — R79.89 ABNORMAL LIVER FUNCTION TESTS: ICD-10-CM

## 2025-04-10 DIAGNOSIS — K51.50 ULCERATIVE COLITIS, LEFT SIDED, WITHOUT COMPLICATIONS (HCC): ICD-10-CM

## 2025-04-10 LAB
DEPRECATED HBV CORE AB SER IA-ACNC: 109 NG/ML (ref 50–306)
HAV IGM SER QL: NONREACTIVE
HBV CORE IGM SER QL: NONREACTIVE
HBV SURFACE AB SER QL: NONREACTIVE
HBV SURFACE AB SERPL IA-ACNC: <3.1 MIU/ML
HBV SURFACE AG SERPL QL IA: NONREACTIVE
HCV AB SERPL QL IA: NONREACTIVE
IRON SATN MFR SERPL: 34 % (ref 15–50)
IRON SERPL-MCNC: 95 UG/DL (ref 50–170)
TOTAL IRON BINDING CAPACITY: 282 UG/DL (ref 250–425)
TRANSFERRIN SERPL-MCNC: 220 MG/DL (ref 250–380)

## 2025-04-10 PROCEDURE — 83540 ASSAY OF IRON: CPT

## 2025-04-10 PROCEDURE — 80074 ACUTE HEPATITIS PANEL: CPT

## 2025-04-10 PROCEDURE — 83516 IMMUNOASSAY NONANTIBODY: CPT

## 2025-04-10 PROCEDURE — 84080 ASSAY ALKALINE PHOSPHATASES: CPT

## 2025-04-10 PROCEDURE — 84075 ASSAY ALKALINE PHOSPHATASE: CPT

## 2025-04-10 PROCEDURE — 82103 ALPHA-1-ANTITRYPSIN TOTAL: CPT

## 2025-04-10 PROCEDURE — 83550 IRON BINDING TEST: CPT

## 2025-04-10 PROCEDURE — G0439 PPPS, SUBSEQ VISIT: HCPCS | Performed by: INTERNAL MEDICINE

## 2025-04-10 PROCEDURE — 82728 ASSAY OF FERRITIN: CPT

## 2025-04-10 PROCEDURE — 86706 HEP B SURFACE ANTIBODY: CPT

## 2025-04-10 PROCEDURE — 36415 COLL VENOUS BLD VENIPUNCTURE: CPT

## 2025-04-10 NOTE — PROGRESS NOTES
Subjective:   Jayda Mays is a 69 year old female who presents for a Medicare Subsequent Annual Wellness visit (Pt already had Initial Annual Wellness) and scheduled follow up of multiple significant but stable problems.   History of Present Illness            Pt has been having some issues with her left knee.  She is going to a place in [place] for none surgical care. She has been back to chair yoga and thinks she may start swimming.   History/Other:   Fall Risk Assessment:   She has been screened for Falls and is low risk.      Cognitive Assessment:   She had a completely normal cognitive assessment - see flowsheet entries     Functional Ability/Status:   Jayda Mays has a completely normal functional assessment. See flowsheet for details.      Depression Screening (PHQ):  PHQ-2 SCORE: 0  , done 4/10/2025   Last [place] Suicide Screening on 4/10/2025 was No Risk.          Advanced Directives:   She does NOT have a Living Will. [Do you have a living will?: No]  She does NOT have a Power of  for Health Care. [Do you have a healthcare power of ?: No]  Discussed Advance Care Planning with patient (and family/surrogate if present). Standard forms made available to patient in After Visit Summary.      Patient Active Problem List   Diagnosis    Ulcerative colitis (HCC)    Obesity    Hypothyroid    Osteoarthritis    Left sided ulcerative (chronic) colitis (HCC)    Arthritis of carpometacarpal (CMC) joint of right thumb    Right wrist sprain, initial encounter    Polyp of colon     Allergies:  She has no known allergies.    Current Medications:  Outpatient Medications Marked as Taking for the 4/10/25 encounter (Office Visit) with Donna Verdugo MD   Medication Sig    Trospium Chloride ER 60 MG Oral Capsule SR 24 Hr Take 1 capsule (60 mg total) by mouth daily.    levothyroxine 75 MCG Oral Tab Take 1 tablet (75 mcg total) by mouth before breakfast.    estradiol 0.1 MG/GM Vaginal Cream Place 1 g vaginally  As Directed.    NON FORMULARY     NON FORMULARY     ascorbic acid (C-1000) 1000 MG Oral Tab     calcipotriene 0.005 % External Cream     Vedolizumab (ENTYVIO IV) Inject into the vein.    Lactobacillus (PROBIOTIC ACIDOPHILUS) Oral Cap Take 30,000,000 Units by mouth daily.    Glucosamine-Chondroit-Vit C-Mn (GLUCOSAMINE CHONDR 1500 COMPLX) Oral Cap Take  by mouth.    L-Lysine 1000 MG Oral Tab Take 1,000 mg by mouth daily.      CALCIUM CITRATE OR by Does not apply route.    Multiple Vitamin (MULTIVITAMIN OR) Take  by mouth.       Medical History:  She  has a past medical history of Belching, Blood in the stool (1 wk ago), Change in hair (nails extremly weak, brittle), Diarrhea, unspecified (2 wks ago), Eczema, Fatigue (2 wks ago), Food intolerance, Hypothyroidism, Irregular bowel habits, Loss of appetite (2 wks ago), Menopausal state, Obesity, unspecified, Osteoarthritis, Pain in joints (4 yrs ago), Pain with bowel movements, Rash (), Stool incontinence (2 wks ago), Stress (I do not remember), Ulcerative colitis (HCC), Ulcerative colitis (HCC), Vitamin D deficiency, Wears glasses (), and Weight loss (2 wks ago).  Surgical History:  She  has a past surgical history that includes ; colonoscopy; sigmoidoscopy,diagnostic; carpal tunnel release;  (/. 2/15/87. 4/24/93. 2/.); and other surgical history (Appx. 2017.).   Family History:  Her family history includes Cancer in her mother; Colon Cancer in her mother; Dementia in her mother; Heart Attack in her father; Heart Disorder in her brother, brother, and father; Hypertension in her father.  Social History:  She  reports that she quit smoking about 45 years ago. Her smoking use included cigarettes. She started smoking about 55 years ago. She has a 10 pack-year smoking history. She has never used smokeless tobacco. She reports current alcohol use of about 2.5 standard drinks of alcohol per week. She reports that she does not use  drugs.    Tobacco:  She smoked tobacco in the past but quit greater than 12 months ago.  Tobacco Use[1]     CAGE Alcohol Screen:   CAGE screening score of 0 on 4/10/2025, showing low risk of alcohol abuse.      Patient Care Team:  Donna Verdugo MD as PCP - General ([facility])  Dr Campbell Hepatologist  Drake Hackett MD (SURGERY, ORTHOPEDIC)  Cesar Baca MD ([facility])  Allison Bray PT as Physical Therapist (Physical Therapy)  Erasto Uribe MD (GASTROENTEROLOGY)  Donna Verdugo MD (Family Medicine)    Review of Systems  GENERAL: feels well otherwise  SKIN: denies any unusual skin lesions  EYES: denies blurred vision or double vision  HEENT: denies nasal congestion, sinus pain or ST  LUNGS: denies shortness of breath with exertion  CARDIOVASCULAR: denies chest pain on exertion  GI: denies abdominal pain, denies heartburn  : denies dysuria, vaginal discharge or itching, no complaint of urinary incontinence   MUSCULOSKELETAL: denies back pain  NEURO: denies headaches  PSYCHE: denies depression or anxiety  HEMATOLOGIC: denies hx of anemia  ENDOCRINE: denies thyroid history  ALL/ASTHMA: denies hx of allergy or asthma    Objective:   Physical Exam  General Appearance:  Alert, cooperative, no distress, appears stated age   Head:  Normocephalic, without obvious abnormality, atraumatic   Eyes:  PERRL, conjunctiva/corneas clear, EOM's intact both eyes   Ears:  Normal TM's and external ear canals, both ears   Nose: Nares normal, septum midline,mucosa normal, no drainage or sinus tenderness   Throat: Lips, mucosa, and tongue normal; teeth and gums normal   Neck: Supple, symmetrical, trachea midline, no adenopathy;  thyroid: not enlarged, symmetric, no tenderness/mass/nodules; no carotid bruit or JVD   Back:   Symmetric, no curvature, ROM normal, no CVA tenderness   Lungs:   Clear to auscultation bilaterally, respirations unlabored   Heart:  Regular rate and rhythm, S1 and S2 normal, no  murmur, rub, or gallop   Abdomen:   Soft, non-tender, bowel sounds active all four quadrants,  no masses, no organomegaly   Pelvic: Deferred   Extremities: Extremities normal, atraumatic, no cyanosis or edema   Pulses: 2+ and symmetric   Skin: Skin color, texture, turgor normal, no rashes or lesions   Lymph nodes: Cervical, supraclavicular, and axillary nodes normal   Neurologic: Normal       Temp 98 °F (36.7 °C) (Temporal)   Resp 18   Ht 5' 3\" (1.6 m)   Wt 202 lb (91.6 kg)   LMP 10/01/2012   BMI 35.78 kg/m²  Estimated body mass index is 35.78 kg/m² as calculated from the following:    Height as of this encounter: 5' 3\" (1.6 m).    Weight as of this encounter: 202 lb (91.6 kg).    Medicare Hearing Assessment:   Hearing Screening    Time taken: 4/10/2025 10:22 AM  Entry User: Donna Verdugo MD  Screening Method: Whisper Test  Whisper Test Result: Pass         Visual Acuity:   Right Eye Visual Acuity: Uncorrected Right Eye Chart Acuity: 20/25   Left Eye Visual Acuity: Uncorrected Left Eye Chart Acuity: 20/30   Both Eyes Visual Acuity: Uncorrected Both Eyes Chart Acuity: 20/25   Able To Tolerate Visual Acuity: Yes        Assessment & Plan:   Jayda Mays is a 69 year old female who presents for a Medicare Assessment.     1. Screening mammogram, encounter for (Primary)  -     [facility] MINOR 2D+3D SCREENING BILAT (CPT=77067/07656); Future; Expected date: 04/10/2025  2. Encounter for annual health examination  3. Ulcerative proctitis without complication (HCC)  4. Obesity due to excess calories without serious comorbidity, unspecified class  5. Acquired hypothyroidism  6. Primary osteoarthritis involving multiple joints  1. Screening mammogram, encounter for  ordered  - [facility] MINOR 2D+3D SCREENING BILAT (CPT=77067/02988); Future    2. Encounter for annual health examination  done    3. Ulcerative proctitis without complication (HCC)  Having MRA of the gallbladder duct due to high alk phosp    4. Obesity due to excess calories  without serious comorbidity, unspecified class  Discussed diet    5. Acquired hypothyroidism  Well controlled, CCM    6. Primary osteoarthritis involving multiple joints  Needs left knee evaluated. Left index finger with a nodule/             The patient indicates understanding of these issues and agrees to the plan.  Reinforced healthy diet, lifestyle, and exercise.      No follow-ups on file.     Donna Verdugo MD, 4/10/2025     Supplementary Documentation:   General Health:  In the past six months, have you lost more than 10 pounds without trying?: 2 - No  Has your appetite been poor?: No  Type of Diet: Balanced  How does the patient maintain a good energy level?: Appropriate Exercise  How would you describe your daily physical activity?: Moderate  How would you describe your current health state?: Good  How do you maintain positive mental well-being?: Social Interaction  On a scale of 0 to 10, with 0 being no pain and 10 being severe pain, what is your pain level?: 2 - (Mild)  In the past six months, have you experienced urine leakage?: 0-No  At any time do you feel concerned for the safety/well-being of yourself and/or your children, in your home or elsewhere?: No  Have you had any immunizations at another office such as Influenza, Hepatitis B, Tetanus, or Pneumococcal?: No    Health Maintenance   Topic Date Due    Pneumococcal Vaccine: 50+ Years (3 of 3 - PCV20 or PCV21) 10/08/2024    Mammogram  01/17/2025    COVID-19 Vaccine (7 - 2024-25 season) 03/30/2025    Annual Physical  04/29/2025    Colorectal Cancer Screening  10/04/2025    HTN: BP Follow-Up  05/10/2025    Pap Smear  04/29/2027    Influenza Vaccine  Completed    DEXA Scan  Completed    Annual Depression Screening  Completed    Fall Risk Screening (Annual)  Completed    Zoster Vaccines  Completed    Meningococcal B Vaccine  Aged Out          [1]   Social History  Tobacco Use   Smoking Status Former    Current packs/day: 0.00    Average packs/day: 1  pack/day for 10.0 years (10.0 ttl pk-yrs)    Types: Cigarettes    Start date: 1969    Quit date: 1979    Years since quittin.9   Smokeless Tobacco Never

## 2025-04-11 LAB
A-1-ANTITRYPSIN: 159 MG/DL
ACTIN SMOOTH MUSCLE AB: 4 UNITS
ALK PHOSPHATASE: 165 IU/L
BONE FRAC: 20 %
INTESTINAL FRAC: 14 %
LIVER FRAC: 67 %
M2 MITOCHONDRIAL AB: <20 UNITS

## 2025-04-21 RX ORDER — LEVOTHYROXINE SODIUM 75 UG/1
75 TABLET ORAL
Qty: 90 TABLET | Refills: 3 | Status: SHIPPED | OUTPATIENT
Start: 2025-04-21

## 2025-04-21 NOTE — TELEPHONE ENCOUNTER
Last refill: 05/07/24  Qty 90  W/ 3 refills  Last ov: 04/10/25    Requested Prescriptions     Pending Prescriptions Disp Refills    LEVOTHYROXINE 75 MCG Oral Tab [Pharmacy Med Name: LEVOTHYROXINE 0.075MG (75MCG) TABS] 90 tablet 3     Sig: TAKE 1 TABLET(75 MCG) BY MOUTH BEFORE BREAKFAST     Future Appointments   Date Time Provider Department Center   5/6/2025 11:00 AM [facility] RM1 (1.5T) [facility] [address]

## 2025-05-05 ENCOUNTER — PATIENT MESSAGE (OUTPATIENT)
Dept: FAMILY MEDICINE CLINIC | Facility: CLINIC | Age: 70
End: 2025-05-05

## 2025-05-05 ENCOUNTER — HOSPITAL ENCOUNTER (OUTPATIENT)
Dept: MAMMOGRAPHY | Age: 70
Discharge: HOME | End: 2025-05-05
Attending: INTERNAL MEDICINE
Payer: MEDICARE

## 2025-05-05 DIAGNOSIS — Z12.31 SCREENING MAMMOGRAM, ENCOUNTER FOR: ICD-10-CM

## 2025-05-05 PROCEDURE — 77063 BREAST TOMOSYNTHESIS BI: CPT | Performed by: INTERNAL MEDICINE

## 2025-05-05 PROCEDURE — 77067 SCR MAMMO BI INCL CAD: CPT | Performed by: INTERNAL MEDICINE

## 2025-05-06 ENCOUNTER — HOSPITAL ENCOUNTER (OUTPATIENT)
Dept: MRI IMAGING | Age: 70
Discharge: HOME | End: 2025-05-06
Attending: INTERNAL MEDICINE
Payer: MEDICARE

## 2025-05-06 DIAGNOSIS — R74.8 ABNORMAL ALKALINE PHOSPHATASE TEST: ICD-10-CM

## 2025-05-06 DIAGNOSIS — K51.00 ULCERATIVE PANCOLITIS WITHOUT COMPLICATION (HCC): ICD-10-CM

## 2025-05-06 PROCEDURE — 76376 3D RENDER W/INTRP POSTPROCES: CPT | Performed by: INTERNAL MEDICINE

## 2025-05-06 PROCEDURE — 74181 MRI ABDOMEN W/O CONTRAST: CPT | Performed by: INTERNAL MEDICINE

## 2025-05-15 ENCOUNTER — MED REC SCAN ONLY (OUTPATIENT)
Dept: FAMILY MEDICINE CLINIC | Facility: CLINIC | Age: 70
End: 2025-05-15

## (undated) DIAGNOSIS — E55.9 VITAMIN D DEFICIENCY: Primary | ICD-10-CM

## (undated) DIAGNOSIS — R53.83 FATIGUE, UNSPECIFIED TYPE: ICD-10-CM

## (undated) NOTE — MR AVS SNAPSHOT
After Visit Summary   10/19/2021    Ana Rosa Hughes   MRN: [id number]           Visit Information     Date & Time  10/19/2021 10:45 AM Provider  Garrett Alvares MD [facility] [facility] [address]  Phone  [phone] REQUEST B [id number] CUSTOM]     THINPREP PAP WITH HPV REFLEX REQUEST [[id number] CUSTOM]     Future Labs/Procedures Expected by Expires    THINPREP PAP WITH HPV REFLEX REQUEST B [[id number] CUSTOM]  10/19/2021 10/19/2022      Future Appointments        Provider D

## (undated) NOTE — LETTER
Patient Name: Nancy Bernabe  YOB: 1955          MRN number:  [id number]  Date:  5/2/2019  Referring Physician:  Dr. Simmons Res  Discharge Summary  Initial Functional Outcome Score 53/100  Final Functional Outcome Score 63/100  Number of Visits Atte improved shoulder mechanics and stabilization with lifting and reaching (10 visits) 5/2/19- GOAL MET  · Pt will be independent and compliant with comprehensive HEP to maintain progress achieved in PT (4 visits) 4/18/19 GOAL MET    Rehab Potential: good

## (undated) NOTE — LETTER
Date: 11/20/2020    Patient Name: Manjula Castañeda          To Whom it may concern: This letter has been written at the patient's request. The above patient was seen at the [facility] for treatment of a medical condition.     This patient should